# Patient Record
Sex: MALE | Race: WHITE | NOT HISPANIC OR LATINO | URBAN - METROPOLITAN AREA
[De-identification: names, ages, dates, MRNs, and addresses within clinical notes are randomized per-mention and may not be internally consistent; named-entity substitution may affect disease eponyms.]

---

## 2022-01-01 ENCOUNTER — INPATIENT (INPATIENT)
Facility: HOSPITAL | Age: 67
LOS: 1 days | End: 2022-01-28
Attending: HOSPITALIST | Admitting: HOSPITALIST
Payer: COMMERCIAL

## 2022-01-01 VITALS — HEART RATE: 95 BPM | RESPIRATION RATE: 34 BRPM

## 2022-01-01 VITALS
SYSTOLIC BLOOD PRESSURE: 141 MMHG | OXYGEN SATURATION: 85 % | HEART RATE: 107 BPM | DIASTOLIC BLOOD PRESSURE: 90 MMHG | RESPIRATION RATE: 26 BRPM | TEMPERATURE: 103 F

## 2022-01-01 DIAGNOSIS — U07.1 COVID-19: ICD-10-CM

## 2022-01-01 DIAGNOSIS — E83.51 HYPOCALCEMIA: ICD-10-CM

## 2022-01-01 DIAGNOSIS — E87.1 HYPO-OSMOLALITY AND HYPONATREMIA: ICD-10-CM

## 2022-01-01 DIAGNOSIS — J96.01 ACUTE RESPIRATORY FAILURE WITH HYPOXIA: ICD-10-CM

## 2022-01-01 LAB
A1C WITH ESTIMATED AVERAGE GLUCOSE RESULT: 6.4 % — HIGH (ref 4–5.6)
ALBUMIN SERPL ELPH-MCNC: 1.9 G/DL — LOW (ref 3.5–5.2)
ALBUMIN SERPL ELPH-MCNC: 2.4 G/DL — LOW (ref 3.5–5.2)
ALBUMIN SERPL ELPH-MCNC: 2.5 G/DL — LOW (ref 3.5–5.2)
ALBUMIN SERPL ELPH-MCNC: 2.7 G/DL — LOW (ref 3.5–5.2)
ALP SERPL-CCNC: 121 U/L — HIGH (ref 30–115)
ALP SERPL-CCNC: 44 U/L — SIGNIFICANT CHANGE UP (ref 30–115)
ALP SERPL-CCNC: 445 U/L — HIGH (ref 30–115)
ALP SERPL-CCNC: 71 U/L — SIGNIFICANT CHANGE UP (ref 30–115)
ALT FLD-CCNC: 1358 U/L — HIGH (ref 0–41)
ALT FLD-CCNC: 15 U/L — SIGNIFICANT CHANGE UP (ref 0–41)
ALT FLD-CCNC: 394 U/L — HIGH (ref 0–41)
ALT FLD-CCNC: 82 U/L — HIGH (ref 0–41)
ANION GAP SERPL CALC-SCNC: 15 MMOL/L — HIGH (ref 7–14)
ANION GAP SERPL CALC-SCNC: 16 MMOL/L — HIGH (ref 7–14)
ANION GAP SERPL CALC-SCNC: 28 MMOL/L — HIGH (ref 7–14)
ANION GAP SERPL CALC-SCNC: 28 MMOL/L — HIGH (ref 7–14)
ANION GAP SERPL CALC-SCNC: 35 MMOL/L — HIGH (ref 7–14)
ANION GAP SERPL CALC-SCNC: 39 MMOL/L — HIGH (ref 7–14)
APPEARANCE UR: CLEAR — SIGNIFICANT CHANGE UP
APTT BLD: 28.6 SEC — SIGNIFICANT CHANGE UP (ref 27–39.2)
APTT BLD: 56.7 SEC — HIGH (ref 27–39.2)
AST SERPL-CCNC: 231 U/L — HIGH (ref 0–41)
AST SERPL-CCNC: 2401 U/L — HIGH (ref 0–41)
AST SERPL-CCNC: 5 U/L — SIGNIFICANT CHANGE UP (ref 0–41)
AST SERPL-CCNC: 715 U/L — HIGH (ref 0–41)
BACTERIA # UR AUTO: ABNORMAL
BASE EXCESS BLDV CALC-SCNC: 5.5 MMOL/L — HIGH (ref -2–3)
BASOPHILS # BLD AUTO: 0.01 K/UL — SIGNIFICANT CHANGE UP (ref 0–0.2)
BASOPHILS # BLD AUTO: 0.02 K/UL — SIGNIFICANT CHANGE UP (ref 0–0.2)
BASOPHILS # BLD AUTO: 0.11 K/UL — SIGNIFICANT CHANGE UP (ref 0–0.2)
BASOPHILS # BLD AUTO: 0.19 K/UL — SIGNIFICANT CHANGE UP (ref 0–0.2)
BASOPHILS NFR BLD AUTO: 0.1 % — SIGNIFICANT CHANGE UP (ref 0–1)
BASOPHILS NFR BLD AUTO: 0.1 % — SIGNIFICANT CHANGE UP (ref 0–1)
BASOPHILS NFR BLD AUTO: 0.3 % — SIGNIFICANT CHANGE UP (ref 0–1)
BASOPHILS NFR BLD AUTO: 0.5 % — SIGNIFICANT CHANGE UP (ref 0–1)
BILIRUB SERPL-MCNC: 0.8 MG/DL — SIGNIFICANT CHANGE UP (ref 0.2–1.2)
BILIRUB SERPL-MCNC: 0.9 MG/DL — SIGNIFICANT CHANGE UP (ref 0.2–1.2)
BILIRUB SERPL-MCNC: 1.2 MG/DL — SIGNIFICANT CHANGE UP (ref 0.2–1.2)
BILIRUB SERPL-MCNC: 1.7 MG/DL — HIGH (ref 0.2–1.2)
BILIRUB UR-MCNC: NEGATIVE — SIGNIFICANT CHANGE UP
BUN SERPL-MCNC: 17 MG/DL — SIGNIFICANT CHANGE UP (ref 10–20)
BUN SERPL-MCNC: 18 MG/DL — SIGNIFICANT CHANGE UP (ref 10–20)
BUN SERPL-MCNC: 20 MG/DL — SIGNIFICANT CHANGE UP (ref 10–20)
BUN SERPL-MCNC: 20 MG/DL — SIGNIFICANT CHANGE UP (ref 10–20)
BUN SERPL-MCNC: 25 MG/DL — HIGH (ref 10–20)
BUN SERPL-MCNC: 26 MG/DL — HIGH (ref 10–20)
CA-I SERPL-SCNC: 0.85 MMOL/L — LOW (ref 1.15–1.33)
CALCIUM SERPL-MCNC: 6.3 MG/DL — LOW (ref 8.5–10.1)
CALCIUM SERPL-MCNC: 6.6 MG/DL — LOW (ref 8.5–10.1)
CALCIUM SERPL-MCNC: 6.6 MG/DL — LOW (ref 8.5–10.1)
CALCIUM SERPL-MCNC: 6.9 MG/DL — LOW (ref 8.5–10.1)
CALCIUM SERPL-MCNC: 7.2 MG/DL — LOW (ref 8.5–10.1)
CALCIUM SERPL-MCNC: 7.8 MG/DL — LOW (ref 8.5–10.1)
CHLORIDE SERPL-SCNC: 77 MMOL/L — LOW (ref 98–110)
CHLORIDE SERPL-SCNC: 78 MMOL/L — LOW (ref 98–110)
CHLORIDE SERPL-SCNC: 78 MMOL/L — LOW (ref 98–110)
CHLORIDE SERPL-SCNC: 79 MMOL/L — LOW (ref 98–110)
CHLORIDE SERPL-SCNC: 80 MMOL/L — LOW (ref 98–110)
CHLORIDE SERPL-SCNC: 81 MMOL/L — LOW (ref 98–110)
CHOLEST SERPL-MCNC: 106 MG/DL — SIGNIFICANT CHANGE UP
CK MB CFR SERPL CALC: 36.4 NG/ML — HIGH (ref 0.6–6.3)
CK SERPL-CCNC: HIGH U/L (ref 0–225)
CO2 SERPL-SCNC: 12 MMOL/L — LOW (ref 17–32)
CO2 SERPL-SCNC: 15 MMOL/L — LOW (ref 17–32)
CO2 SERPL-SCNC: 15 MMOL/L — LOW (ref 17–32)
CO2 SERPL-SCNC: 19 MMOL/L — SIGNIFICANT CHANGE UP (ref 17–32)
CO2 SERPL-SCNC: 21 MMOL/L — SIGNIFICANT CHANGE UP (ref 17–32)
CO2 SERPL-SCNC: 9 MMOL/L — CRITICAL LOW (ref 17–32)
COLOR SPEC: YELLOW — SIGNIFICANT CHANGE UP
CREAT SERPL-MCNC: 0.8 MG/DL — SIGNIFICANT CHANGE UP (ref 0.7–1.5)
CREAT SERPL-MCNC: 1 MG/DL — SIGNIFICANT CHANGE UP (ref 0.7–1.5)
CREAT SERPL-MCNC: 1.4 MG/DL — SIGNIFICANT CHANGE UP (ref 0.7–1.5)
CREAT SERPL-MCNC: 1.8 MG/DL — HIGH (ref 0.7–1.5)
CREAT SERPL-MCNC: 2.5 MG/DL — HIGH (ref 0.7–1.5)
CREAT SERPL-MCNC: 3.2 MG/DL — HIGH (ref 0.7–1.5)
CULTURE RESULTS: NO GROWTH — SIGNIFICANT CHANGE UP
D DIMER BLD IA.RAPID-MCNC: 7088 NG/ML DDU — HIGH (ref 0–230)
DIFF PNL FLD: ABNORMAL
EOSINOPHIL # BLD AUTO: 0 K/UL — SIGNIFICANT CHANGE UP (ref 0–0.7)
EOSINOPHIL # BLD AUTO: 0.01 K/UL — SIGNIFICANT CHANGE UP (ref 0–0.7)
EOSINOPHIL NFR BLD AUTO: 0 % — SIGNIFICANT CHANGE UP (ref 0–8)
EPI CELLS # UR: ABNORMAL /HPF
ESTIMATED AVERAGE GLUCOSE: 137 MG/DL — HIGH (ref 68–114)
GAS PNL BLDV: 110 MMOL/L — CRITICAL LOW (ref 136–145)
GAS PNL BLDV: SIGNIFICANT CHANGE UP
GLUCOSE SERPL-MCNC: 147 MG/DL — HIGH (ref 70–99)
GLUCOSE SERPL-MCNC: 148 MG/DL — HIGH (ref 70–99)
GLUCOSE SERPL-MCNC: 154 MG/DL — HIGH (ref 70–99)
GLUCOSE SERPL-MCNC: 163 MG/DL — HIGH (ref 70–99)
GLUCOSE SERPL-MCNC: 172 MG/DL — HIGH (ref 70–99)
GLUCOSE SERPL-MCNC: 98 MG/DL — SIGNIFICANT CHANGE UP (ref 70–99)
GLUCOSE UR QL: NEGATIVE MG/DL — SIGNIFICANT CHANGE UP
GRAN CASTS # UR COMP ASSIST: ABNORMAL /LPF
HCO3 BLDV-SCNC: 27 MMOL/L — SIGNIFICANT CHANGE UP (ref 22–29)
HCT VFR BLD CALC: 35.9 % — LOW (ref 42–52)
HCT VFR BLD CALC: 39.7 % — LOW (ref 42–52)
HCT VFR BLD CALC: 40.8 % — LOW (ref 42–52)
HCT VFR BLD CALC: 44.5 % — SIGNIFICANT CHANGE UP (ref 42–52)
HCT VFR BLDA CALC: 51 % — SIGNIFICANT CHANGE UP (ref 39–51)
HCV AB S/CO SERPL IA: 0.03 COI — SIGNIFICANT CHANGE UP
HCV AB SERPL-IMP: SIGNIFICANT CHANGE UP
HDLC SERPL-MCNC: 6 MG/DL — LOW
HGB BLD CALC-MCNC: 17.1 G/DL — SIGNIFICANT CHANGE UP (ref 12.6–17.4)
HGB BLD-MCNC: 12.6 G/DL — LOW (ref 14–18)
HGB BLD-MCNC: 12.8 G/DL — LOW (ref 14–18)
HGB BLD-MCNC: 14.5 G/DL — SIGNIFICANT CHANGE UP (ref 14–18)
HGB BLD-MCNC: 14.5 G/DL — SIGNIFICANT CHANGE UP (ref 14–18)
IMM GRANULOCYTES NFR BLD AUTO: 1.2 % — HIGH (ref 0.1–0.3)
IMM GRANULOCYTES NFR BLD AUTO: 1.6 % — HIGH (ref 0.1–0.3)
IMM GRANULOCYTES NFR BLD AUTO: 4.9 % — HIGH (ref 0.1–0.3)
IMM GRANULOCYTES NFR BLD AUTO: 5.6 % — HIGH (ref 0.1–0.3)
INR BLD: 1.37 RATIO — HIGH (ref 0.65–1.3)
KETONES UR-MCNC: 40
LACTATE BLDV-MCNC: 2.4 MMOL/L — HIGH (ref 0.5–2)
LACTATE SERPL-SCNC: 18.2 MMOL/L — CRITICAL HIGH (ref 0.7–2)
LDH SERPL L TO P-CCNC: 829 U/L — HIGH (ref 50–242)
LEUKOCYTE ESTERASE UR-ACNC: NEGATIVE — SIGNIFICANT CHANGE UP
LIPID PNL WITH DIRECT LDL SERPL: 61 MG/DL — SIGNIFICANT CHANGE UP
LYMPHOCYTES # BLD AUTO: 0.55 K/UL — LOW (ref 1.2–3.4)
LYMPHOCYTES # BLD AUTO: 0.85 K/UL — LOW (ref 1.2–3.4)
LYMPHOCYTES # BLD AUTO: 2.09 K/UL — SIGNIFICANT CHANGE UP (ref 1.2–3.4)
LYMPHOCYTES # BLD AUTO: 2.52 K/UL — SIGNIFICANT CHANGE UP (ref 1.2–3.4)
LYMPHOCYTES # BLD AUTO: 3.7 % — LOW (ref 20.5–51.1)
LYMPHOCYTES # BLD AUTO: 6.2 % — LOW (ref 20.5–51.1)
LYMPHOCYTES # BLD AUTO: 6.4 % — LOW (ref 20.5–51.1)
LYMPHOCYTES # BLD AUTO: 7.7 % — LOW (ref 20.5–51.1)
MAGNESIUM SERPL-MCNC: 2.7 MG/DL — HIGH (ref 1.8–2.4)
MCHC RBC-ENTMCNC: 28.4 PG — SIGNIFICANT CHANGE UP (ref 27–31)
MCHC RBC-ENTMCNC: 28.4 PG — SIGNIFICANT CHANGE UP (ref 27–31)
MCHC RBC-ENTMCNC: 28.7 PG — SIGNIFICANT CHANGE UP (ref 27–31)
MCHC RBC-ENTMCNC: 29 PG — SIGNIFICANT CHANGE UP (ref 27–31)
MCHC RBC-ENTMCNC: 31.7 G/DL — LOW (ref 32–37)
MCHC RBC-ENTMCNC: 32.6 G/DL — SIGNIFICANT CHANGE UP (ref 32–37)
MCHC RBC-ENTMCNC: 35.5 G/DL — SIGNIFICANT CHANGE UP (ref 32–37)
MCHC RBC-ENTMCNC: 35.7 G/DL — SIGNIFICANT CHANGE UP (ref 32–37)
MCV RBC AUTO: 79.8 FL — LOW (ref 80–94)
MCV RBC AUTO: 80.5 FL — SIGNIFICANT CHANGE UP (ref 80–94)
MCV RBC AUTO: 87.3 FL — SIGNIFICANT CHANGE UP (ref 80–94)
MCV RBC AUTO: 91.5 FL — SIGNIFICANT CHANGE UP (ref 80–94)
MONOCYTES # BLD AUTO: 0.39 K/UL — SIGNIFICANT CHANGE UP (ref 0.1–0.6)
MONOCYTES # BLD AUTO: 0.44 K/UL — SIGNIFICANT CHANGE UP (ref 0.1–0.6)
MONOCYTES # BLD AUTO: 0.44 K/UL — SIGNIFICANT CHANGE UP (ref 0.1–0.6)
MONOCYTES # BLD AUTO: 0.51 K/UL — SIGNIFICANT CHANGE UP (ref 0.1–0.6)
MONOCYTES NFR BLD AUTO: 1.1 % — LOW (ref 1.7–9.3)
MONOCYTES NFR BLD AUTO: 1.3 % — LOW (ref 1.7–9.3)
MONOCYTES NFR BLD AUTO: 2.6 % — SIGNIFICANT CHANGE UP (ref 1.7–9.3)
MONOCYTES NFR BLD AUTO: 4.6 % — SIGNIFICANT CHANGE UP (ref 1.7–9.3)
NEUTROPHILS # BLD AUTO: 13.77 K/UL — HIGH (ref 1.4–6.5)
NEUTROPHILS # BLD AUTO: 28.32 K/UL — HIGH (ref 1.4–6.5)
NEUTROPHILS # BLD AUTO: 35.44 K/UL — HIGH (ref 1.4–6.5)
NEUTROPHILS # BLD AUTO: 9.5 K/UL — HIGH (ref 1.4–6.5)
NEUTROPHILS NFR BLD AUTO: 86 % — HIGH (ref 42.2–75.2)
NEUTROPHILS NFR BLD AUTO: 86.4 % — HIGH (ref 42.2–75.2)
NEUTROPHILS NFR BLD AUTO: 87.3 % — HIGH (ref 42.2–75.2)
NEUTROPHILS NFR BLD AUTO: 92.4 % — HIGH (ref 42.2–75.2)
NITRITE UR-MCNC: NEGATIVE — SIGNIFICANT CHANGE UP
NON HDL CHOLESTEROL: 100 MG/DL — SIGNIFICANT CHANGE UP
NRBC # BLD: 0 /100 WBCS — SIGNIFICANT CHANGE UP (ref 0–0)
NRBC # BLD: 0 /100 — SIGNIFICANT CHANGE UP (ref 0–0)
OSMOLALITY SERPL: 253 MOS/KG — LOW (ref 280–301)
OSMOLALITY UR: 573 MOS/KG — SIGNIFICANT CHANGE UP (ref 50–1200)
PCO2 BLDV: 30 MMHG — LOW (ref 42–55)
PH BLDV: 7.56 — HIGH (ref 7.32–7.43)
PH UR: 6.5 — SIGNIFICANT CHANGE UP (ref 5–8)
PHOSPHATE SERPL-MCNC: 16.7 MG/DL — HIGH (ref 2.1–4.9)
PLAT MORPH BLD: NORMAL — SIGNIFICANT CHANGE UP
PLATELET # BLD AUTO: 189 K/UL — SIGNIFICANT CHANGE UP (ref 130–400)
PLATELET # BLD AUTO: 266 K/UL — SIGNIFICANT CHANGE UP (ref 130–400)
PLATELET # BLD AUTO: 276 K/UL — SIGNIFICANT CHANGE UP (ref 130–400)
PLATELET # BLD AUTO: 64 K/UL — LOW (ref 130–400)
PO2 BLDV: 29 MMHG — SIGNIFICANT CHANGE UP
POTASSIUM BLDV-SCNC: 3.1 MMOL/L — LOW (ref 3.5–5.1)
POTASSIUM SERPL-MCNC: 3.4 MMOL/L — LOW (ref 3.5–5)
POTASSIUM SERPL-MCNC: 3.8 MMOL/L — SIGNIFICANT CHANGE UP (ref 3.5–5)
POTASSIUM SERPL-MCNC: 4.3 MMOL/L — SIGNIFICANT CHANGE UP (ref 3.5–5)
POTASSIUM SERPL-MCNC: 4.9 MMOL/L — SIGNIFICANT CHANGE UP (ref 3.5–5)
POTASSIUM SERPL-MCNC: 5.4 MMOL/L — HIGH (ref 3.5–5)
POTASSIUM SERPL-MCNC: 6.5 MMOL/L — CRITICAL HIGH (ref 3.5–5)
POTASSIUM SERPL-SCNC: 3.4 MMOL/L — LOW (ref 3.5–5)
POTASSIUM SERPL-SCNC: 3.8 MMOL/L — SIGNIFICANT CHANGE UP (ref 3.5–5)
POTASSIUM SERPL-SCNC: 4.3 MMOL/L — SIGNIFICANT CHANGE UP (ref 3.5–5)
POTASSIUM SERPL-SCNC: 4.9 MMOL/L — SIGNIFICANT CHANGE UP (ref 3.5–5)
POTASSIUM SERPL-SCNC: 5.4 MMOL/L — HIGH (ref 3.5–5)
POTASSIUM SERPL-SCNC: 6.5 MMOL/L — CRITICAL HIGH (ref 3.5–5)
PROCALCITONIN SERPL-MCNC: 55.8 NG/ML — HIGH (ref 0.02–0.1)
PROT SERPL-MCNC: 4 G/DL — LOW (ref 6–8)
PROT SERPL-MCNC: 4.7 G/DL — LOW (ref 6–8)
PROT SERPL-MCNC: 4.8 G/DL — LOW (ref 6–8)
PROT SERPL-MCNC: 5.2 G/DL — LOW (ref 6–8)
PROT UR-MCNC: 100 MG/DL
PROTHROM AB SERPL-ACNC: 15.7 SEC — HIGH (ref 9.95–12.87)
RBC # BLD: 4.34 M/UL — LOW (ref 4.7–6.1)
RBC # BLD: 4.46 M/UL — LOW (ref 4.7–6.1)
RBC # BLD: 5.1 M/UL — SIGNIFICANT CHANGE UP (ref 4.7–6.1)
RBC # BLD: 5.11 M/UL — SIGNIFICANT CHANGE UP (ref 4.7–6.1)
RBC # FLD: 12.6 % — SIGNIFICANT CHANGE UP (ref 11.5–14.5)
RBC # FLD: 12.9 % — SIGNIFICANT CHANGE UP (ref 11.5–14.5)
RBC # FLD: 13.4 % — SIGNIFICANT CHANGE UP (ref 11.5–14.5)
RBC # FLD: 13.9 % — SIGNIFICANT CHANGE UP (ref 11.5–14.5)
RBC BLD AUTO: NORMAL — SIGNIFICANT CHANGE UP
RBC CASTS # UR COMP ASSIST: ABNORMAL /HPF
SAO2 % BLDV: 58.9 % — SIGNIFICANT CHANGE UP
SARS-COV-2 RNA SPEC QL NAA+PROBE: DETECTED
SODIUM SERPL-SCNC: 112 MMOL/L — CRITICAL LOW (ref 135–146)
SODIUM SERPL-SCNC: 116 MMOL/L — CRITICAL LOW (ref 135–146)
SODIUM SERPL-SCNC: 121 MMOL/L — LOW (ref 135–146)
SODIUM SERPL-SCNC: 124 MMOL/L — LOW (ref 135–146)
SODIUM SERPL-SCNC: 126 MMOL/L — LOW (ref 135–146)
SODIUM SERPL-SCNC: 126 MMOL/L — LOW (ref 135–146)
SODIUM UR-SCNC: 93 MMOL/L — SIGNIFICANT CHANGE UP
SP GR SPEC: >=1.03 (ref 1.01–1.03)
SPECIMEN SOURCE: SIGNIFICANT CHANGE UP
TRIGL SERPL-MCNC: 261 MG/DL — HIGH
TROPONIN T SERPL-MCNC: 0.3 NG/ML — CRITICAL HIGH
TROPONIN T SERPL-MCNC: 0.69 NG/ML — CRITICAL HIGH
TROPONIN T SERPL-MCNC: 1.44 NG/ML — CRITICAL HIGH
TROPONIN T SERPL-MCNC: 1.68 NG/ML — CRITICAL HIGH
TSH SERPL-MCNC: 0.87 UIU/ML — SIGNIFICANT CHANGE UP (ref 0.27–4.2)
UROBILINOGEN FLD QL: 1 MG/DL
VANCOMYCIN FLD-MCNC: 32.4 UG/ML — HIGH (ref 5–10)
WBC # BLD: 11.05 K/UL — HIGH (ref 4.8–10.8)
WBC # BLD: 14.91 K/UL — HIGH (ref 4.8–10.8)
WBC # BLD: 32.81 K/UL — HIGH (ref 4.8–10.8)
WBC # BLD: 40.61 K/UL — CRITICAL HIGH (ref 4.8–10.8)
WBC # FLD AUTO: 11.05 K/UL — HIGH (ref 4.8–10.8)
WBC # FLD AUTO: 14.91 K/UL — HIGH (ref 4.8–10.8)
WBC # FLD AUTO: 32.81 K/UL — HIGH (ref 4.8–10.8)
WBC # FLD AUTO: 40.61 K/UL — CRITICAL HIGH (ref 4.8–10.8)
WBC UR QL: NEGATIVE — SIGNIFICANT CHANGE UP

## 2022-01-01 PROCEDURE — 93306 TTE W/DOPPLER COMPLETE: CPT | Mod: 26

## 2022-01-01 PROCEDURE — 99291 CRITICAL CARE FIRST HOUR: CPT

## 2022-01-01 PROCEDURE — 99222 1ST HOSP IP/OBS MODERATE 55: CPT

## 2022-01-01 PROCEDURE — 93010 ELECTROCARDIOGRAM REPORT: CPT

## 2022-01-01 PROCEDURE — 93970 EXTREMITY STUDY: CPT | Mod: 26

## 2022-01-01 PROCEDURE — 71045 X-RAY EXAM CHEST 1 VIEW: CPT | Mod: 26

## 2022-01-01 PROCEDURE — 99232 SBSQ HOSP IP/OBS MODERATE 35: CPT

## 2022-01-01 RX ORDER — ASCORBIC ACID 60 MG
500 TABLET,CHEWABLE ORAL DAILY
Refills: 0 | Status: DISCONTINUED | OUTPATIENT
Start: 2022-01-01 | End: 2022-01-01

## 2022-01-01 RX ORDER — VANCOMYCIN HCL 1 G
1000 VIAL (EA) INTRAVENOUS ONCE
Refills: 0 | Status: COMPLETED | OUTPATIENT
Start: 2022-01-01 | End: 2022-01-01

## 2022-01-01 RX ORDER — AMIODARONE HYDROCHLORIDE 400 MG/1
0.5 TABLET ORAL
Qty: 900 | Refills: 0 | Status: DISCONTINUED | OUTPATIENT
Start: 2022-01-01 | End: 2022-01-01

## 2022-01-01 RX ORDER — INSULIN HUMAN 100 [IU]/ML
10 INJECTION, SOLUTION SUBCUTANEOUS ONCE
Refills: 0 | Status: COMPLETED | OUTPATIENT
Start: 2022-01-01 | End: 2022-01-01

## 2022-01-01 RX ORDER — CHLORHEXIDINE GLUCONATE 213 G/1000ML
15 SOLUTION TOPICAL EVERY 12 HOURS
Refills: 0 | Status: DISCONTINUED | OUTPATIENT
Start: 2022-01-01 | End: 2022-01-01

## 2022-01-01 RX ORDER — FUROSEMIDE 40 MG
80 TABLET ORAL ONCE
Refills: 0 | Status: COMPLETED | OUTPATIENT
Start: 2022-01-01 | End: 2022-01-01

## 2022-01-01 RX ORDER — HYDROMORPHONE HYDROCHLORIDE 2 MG/ML
1 INJECTION INTRAMUSCULAR; INTRAVENOUS; SUBCUTANEOUS
Refills: 0 | Status: DISCONTINUED | OUTPATIENT
Start: 2022-01-01 | End: 2022-01-01

## 2022-01-01 RX ORDER — DEXMEDETOMIDINE HYDROCHLORIDE IN 0.9% SODIUM CHLORIDE 4 UG/ML
0.05 INJECTION INTRAVENOUS
Qty: 400 | Refills: 0 | Status: DISCONTINUED | OUTPATIENT
Start: 2022-01-01 | End: 2022-01-01

## 2022-01-01 RX ORDER — METOPROLOL TARTRATE 50 MG
5 TABLET ORAL ONCE
Refills: 0 | Status: COMPLETED | OUTPATIENT
Start: 2022-01-01 | End: 2022-01-01

## 2022-01-01 RX ORDER — CALCIUM GLUCONATE 100 MG/ML
1 VIAL (ML) INTRAVENOUS ONCE
Refills: 0 | Status: COMPLETED | OUTPATIENT
Start: 2022-01-01 | End: 2022-01-01

## 2022-01-01 RX ORDER — DEXTROSE 50 % IN WATER 50 %
50 SYRINGE (ML) INTRAVENOUS ONCE
Refills: 0 | Status: COMPLETED | OUTPATIENT
Start: 2022-01-01 | End: 2022-01-01

## 2022-01-01 RX ORDER — SODIUM CHLORIDE 9 MG/ML
1000 INJECTION INTRAMUSCULAR; INTRAVENOUS; SUBCUTANEOUS ONCE
Refills: 0 | Status: COMPLETED | OUTPATIENT
Start: 2022-01-01 | End: 2022-01-01

## 2022-01-01 RX ORDER — SODIUM CHLORIDE 5 G/100ML
90 INJECTION, SOLUTION INTRAVENOUS
Refills: 0 | Status: DISCONTINUED | OUTPATIENT
Start: 2022-01-01 | End: 2022-01-01

## 2022-01-01 RX ORDER — CEFEPIME 1 G/1
2000 INJECTION, POWDER, FOR SOLUTION INTRAMUSCULAR; INTRAVENOUS ONCE
Refills: 0 | Status: COMPLETED | OUTPATIENT
Start: 2022-01-01 | End: 2022-01-01

## 2022-01-01 RX ORDER — CEFEPIME 1 G/1
1000 INJECTION, POWDER, FOR SOLUTION INTRAMUSCULAR; INTRAVENOUS EVERY 8 HOURS
Refills: 0 | Status: DISCONTINUED | OUTPATIENT
Start: 2022-01-01 | End: 2022-01-01

## 2022-01-01 RX ORDER — FENTANYL CITRATE 50 UG/ML
0.5 INJECTION INTRAVENOUS
Qty: 2500 | Refills: 0 | Status: DISCONTINUED | OUTPATIENT
Start: 2022-01-01 | End: 2022-01-01

## 2022-01-01 RX ORDER — SODIUM ZIRCONIUM CYCLOSILICATE 10 G/10G
10 POWDER, FOR SUSPENSION ORAL ONCE
Refills: 0 | Status: COMPLETED | OUTPATIENT
Start: 2022-01-01 | End: 2022-01-01

## 2022-01-01 RX ORDER — VANCOMYCIN HCL 1 G
1500 VIAL (EA) INTRAVENOUS EVERY 12 HOURS
Refills: 0 | Status: DISCONTINUED | OUTPATIENT
Start: 2022-01-01 | End: 2022-01-01

## 2022-01-01 RX ORDER — HEPARIN SODIUM 5000 [USP'U]/ML
5000 INJECTION INTRAVENOUS; SUBCUTANEOUS EVERY 8 HOURS
Refills: 0 | Status: DISCONTINUED | OUTPATIENT
Start: 2022-01-01 | End: 2022-01-01

## 2022-01-01 RX ORDER — CISATRACURIUM BESYLATE 2 MG/ML
15 INJECTION INTRAVENOUS ONCE
Refills: 0 | Status: COMPLETED | OUTPATIENT
Start: 2022-01-01 | End: 2022-01-01

## 2022-01-01 RX ORDER — MIDAZOLAM HYDROCHLORIDE 1 MG/ML
0.02 INJECTION, SOLUTION INTRAMUSCULAR; INTRAVENOUS
Qty: 100 | Refills: 0 | Status: DISCONTINUED | OUTPATIENT
Start: 2022-01-01 | End: 2022-01-01

## 2022-01-01 RX ORDER — CEFEPIME 1 G/1
1000 INJECTION, POWDER, FOR SOLUTION INTRAMUSCULAR; INTRAVENOUS DAILY
Refills: 0 | Status: DISCONTINUED | OUTPATIENT
Start: 2022-01-01 | End: 2022-01-01

## 2022-01-01 RX ORDER — CISATRACURIUM BESYLATE 2 MG/ML
3 INJECTION INTRAVENOUS
Qty: 200 | Refills: 0 | Status: DISCONTINUED | OUTPATIENT
Start: 2022-01-01 | End: 2022-01-01

## 2022-01-01 RX ORDER — ACETAMINOPHEN 500 MG
650 TABLET ORAL EVERY 4 HOURS
Refills: 0 | Status: DISCONTINUED | OUTPATIENT
Start: 2022-01-01 | End: 2022-01-01

## 2022-01-01 RX ORDER — ACETAMINOPHEN 500 MG
975 TABLET ORAL ONCE
Refills: 0 | Status: COMPLETED | OUTPATIENT
Start: 2022-01-01 | End: 2022-01-01

## 2022-01-01 RX ORDER — HEPARIN SODIUM 5000 [USP'U]/ML
1800 INJECTION INTRAVENOUS; SUBCUTANEOUS
Qty: 25000 | Refills: 0 | Status: DISCONTINUED | OUTPATIENT
Start: 2022-01-01 | End: 2022-01-01

## 2022-01-01 RX ORDER — AMIODARONE HYDROCHLORIDE 400 MG/1
150 TABLET ORAL ONCE
Refills: 0 | Status: COMPLETED | OUTPATIENT
Start: 2022-01-01 | End: 2022-01-01

## 2022-01-01 RX ORDER — VASOPRESSIN 20 [USP'U]/ML
0.04 INJECTION INTRAVENOUS
Qty: 50 | Refills: 0 | Status: DISCONTINUED | OUTPATIENT
Start: 2022-01-01 | End: 2022-01-01

## 2022-01-01 RX ORDER — HYDROMORPHONE HYDROCHLORIDE 2 MG/ML
1 INJECTION INTRAMUSCULAR; INTRAVENOUS; SUBCUTANEOUS EVERY 4 HOURS
Refills: 0 | Status: DISCONTINUED | OUTPATIENT
Start: 2022-01-01 | End: 2022-01-01

## 2022-01-01 RX ORDER — POTASSIUM CHLORIDE 20 MEQ
20 PACKET (EA) ORAL ONCE
Refills: 0 | Status: COMPLETED | OUTPATIENT
Start: 2022-01-01 | End: 2022-01-01

## 2022-01-01 RX ORDER — CISATRACURIUM BESYLATE 2 MG/ML
20 INJECTION INTRAVENOUS ONCE
Refills: 0 | Status: COMPLETED | OUTPATIENT
Start: 2022-01-01 | End: 2022-01-01

## 2022-01-01 RX ORDER — ENOXAPARIN SODIUM 100 MG/ML
40 INJECTION SUBCUTANEOUS DAILY
Refills: 0 | Status: DISCONTINUED | OUTPATIENT
Start: 2022-01-01 | End: 2022-01-01

## 2022-01-01 RX ORDER — FENTANYL CITRATE 50 UG/ML
50 INJECTION INTRAVENOUS ONCE
Refills: 0 | Status: DISCONTINUED | OUTPATIENT
Start: 2022-01-01 | End: 2022-01-01

## 2022-01-01 RX ORDER — PROPOFOL 10 MG/ML
5 INJECTION, EMULSION INTRAVENOUS
Qty: 1000 | Refills: 0 | Status: DISCONTINUED | OUTPATIENT
Start: 2022-01-01 | End: 2022-01-01

## 2022-01-01 RX ORDER — NOREPINEPHRINE BITARTRATE/D5W 8 MG/250ML
0.05 PLASTIC BAG, INJECTION (ML) INTRAVENOUS
Qty: 32 | Refills: 0 | Status: DISCONTINUED | OUTPATIENT
Start: 2022-01-01 | End: 2022-01-01

## 2022-01-01 RX ORDER — AMIODARONE HYDROCHLORIDE 400 MG/1
1 TABLET ORAL
Qty: 900 | Refills: 0 | Status: DISCONTINUED | OUTPATIENT
Start: 2022-01-01 | End: 2022-01-01

## 2022-01-01 RX ORDER — DEXAMETHASONE 0.5 MG/5ML
6 ELIXIR ORAL DAILY
Refills: 0 | Status: DISCONTINUED | OUTPATIENT
Start: 2022-01-01 | End: 2022-01-01

## 2022-01-01 RX ORDER — SODIUM BICARBONATE 1 MEQ/ML
0.22 SYRINGE (ML) INTRAVENOUS
Qty: 150 | Refills: 0 | Status: DISCONTINUED | OUTPATIENT
Start: 2022-01-01 | End: 2022-01-01

## 2022-01-01 RX ORDER — VANCOMYCIN HCL 1 G
1500 VIAL (EA) INTRAVENOUS ONCE
Refills: 0 | Status: COMPLETED | OUTPATIENT
Start: 2022-01-01 | End: 2022-01-01

## 2022-01-01 RX ORDER — TOCILIZUMAB 20 MG/ML
600 INJECTION, SOLUTION, CONCENTRATE INTRAVENOUS ONCE
Refills: 0 | Status: COMPLETED | OUTPATIENT
Start: 2022-01-01 | End: 2022-01-01

## 2022-01-01 RX ADMIN — Medication 975 MILLIGRAM(S): at 00:25

## 2022-01-01 RX ADMIN — TOCILIZUMAB 100 MILLIGRAM(S): 20 INJECTION, SOLUTION, CONCENTRATE INTRAVENOUS at 19:02

## 2022-01-01 RX ADMIN — Medication 150 MEQ/KG/HR: at 12:52

## 2022-01-01 RX ADMIN — AMIODARONE HYDROCHLORIDE 16.7 MG/MIN: 400 TABLET ORAL at 21:01

## 2022-01-01 RX ADMIN — MIDAZOLAM HYDROCHLORIDE 2.02 MG/KG/HR: 1 INJECTION, SOLUTION INTRAMUSCULAR; INTRAVENOUS at 12:36

## 2022-01-01 RX ADMIN — AMIODARONE HYDROCHLORIDE 16.7 MG/MIN: 400 TABLET ORAL at 18:15

## 2022-01-01 RX ADMIN — FENTANYL CITRATE 50 MICROGRAM(S): 50 INJECTION INTRAVENOUS at 10:15

## 2022-01-01 RX ADMIN — Medication 50 MILLILITER(S): at 16:05

## 2022-01-01 RX ADMIN — SODIUM ZIRCONIUM CYCLOSILICATE 10 GRAM(S): 10 POWDER, FOR SUSPENSION ORAL at 15:16

## 2022-01-01 RX ADMIN — Medication 4.73 MICROGRAM(S)/KG/MIN: at 11:30

## 2022-01-01 RX ADMIN — VASOPRESSIN 2.4 UNIT(S)/MIN: 20 INJECTION INTRAVENOUS at 11:43

## 2022-01-01 RX ADMIN — AMIODARONE HYDROCHLORIDE 33.3 MG/MIN: 400 TABLET ORAL at 11:06

## 2022-01-01 RX ADMIN — Medication 300 MILLIGRAM(S): at 13:39

## 2022-01-01 RX ADMIN — FENTANYL CITRATE 50 MICROGRAM(S): 50 INJECTION INTRAVENOUS at 10:00

## 2022-01-01 RX ADMIN — CEFEPIME 100 MILLIGRAM(S): 1 INJECTION, POWDER, FOR SOLUTION INTRAMUSCULAR; INTRAVENOUS at 18:13

## 2022-01-01 RX ADMIN — Medication 150 MEQ/KG/HR: at 12:19

## 2022-01-01 RX ADMIN — Medication 4.73 MICROGRAM(S)/KG/MIN: at 13:04

## 2022-01-01 RX ADMIN — AMIODARONE HYDROCHLORIDE 600 MILLIGRAM(S): 400 TABLET ORAL at 11:03

## 2022-01-01 RX ADMIN — HEPARIN SODIUM 18 UNIT(S)/HR: 5000 INJECTION INTRAVENOUS; SUBCUTANEOUS at 13:39

## 2022-01-01 RX ADMIN — CISATRACURIUM BESYLATE 18.2 MICROGRAM(S)/KG/MIN: 2 INJECTION INTRAVENOUS at 12:00

## 2022-01-01 RX ADMIN — PROPOFOL 3.03 MICROGRAM(S)/KG/MIN: 10 INJECTION, EMULSION INTRAVENOUS at 10:33

## 2022-01-01 RX ADMIN — FENTANYL CITRATE 5.05 MICROGRAM(S)/KG/HR: 50 INJECTION INTRAVENOUS at 00:49

## 2022-01-01 RX ADMIN — Medication 300 MILLIGRAM(S): at 00:52

## 2022-01-01 RX ADMIN — FENTANYL CITRATE 5.05 MICROGRAM(S)/KG/HR: 50 INJECTION INTRAVENOUS at 12:36

## 2022-01-01 RX ADMIN — CEFEPIME 100 MILLIGRAM(S): 1 INJECTION, POWDER, FOR SOLUTION INTRAMUSCULAR; INTRAVENOUS at 12:18

## 2022-01-01 RX ADMIN — CEFEPIME 100 MILLIGRAM(S): 1 INJECTION, POWDER, FOR SOLUTION INTRAMUSCULAR; INTRAVENOUS at 00:08

## 2022-01-01 RX ADMIN — Medication 6 MILLIGRAM(S): at 09:28

## 2022-01-01 RX ADMIN — CHLORHEXIDINE GLUCONATE 15 MILLILITER(S): 213 SOLUTION TOPICAL at 18:14

## 2022-01-01 RX ADMIN — Medication 975 MILLIGRAM(S): at 04:30

## 2022-01-01 RX ADMIN — Medication 4.73 MICROGRAM(S)/KG/MIN: at 23:05

## 2022-01-01 RX ADMIN — CISATRACURIUM BESYLATE 20 MILLIGRAM(S): 2 INJECTION INTRAVENOUS at 11:00

## 2022-01-01 RX ADMIN — Medication 650 MILLIGRAM(S): at 09:23

## 2022-01-01 RX ADMIN — Medication 150 MEQ/KG/HR: at 21:00

## 2022-01-01 RX ADMIN — VASOPRESSIN 2.4 UNIT(S)/MIN: 20 INJECTION INTRAVENOUS at 21:01

## 2022-01-01 RX ADMIN — Medication 5 MILLIGRAM(S): at 09:56

## 2022-01-01 RX ADMIN — Medication 4.73 MICROGRAM(S)/KG/MIN: at 00:49

## 2022-01-01 RX ADMIN — Medication 500 MILLIGRAM(S): at 12:18

## 2022-01-01 RX ADMIN — Medication 100 GRAM(S): at 15:16

## 2022-01-01 RX ADMIN — SODIUM CHLORIDE 1000 MILLILITER(S): 9 INJECTION INTRAMUSCULAR; INTRAVENOUS; SUBCUTANEOUS at 08:34

## 2022-01-01 RX ADMIN — CEFEPIME 100 MILLIGRAM(S): 1 INJECTION, POWDER, FOR SOLUTION INTRAMUSCULAR; INTRAVENOUS at 23:05

## 2022-01-01 RX ADMIN — Medication 150 MEQ/KG/HR: at 16:13

## 2022-01-01 RX ADMIN — FENTANYL CITRATE 5.05 MICROGRAM(S)/KG/HR: 50 INJECTION INTRAVENOUS at 21:00

## 2022-01-01 RX ADMIN — SODIUM CHLORIDE 30 MILLILITER(S): 5 INJECTION, SOLUTION INTRAVENOUS at 05:09

## 2022-01-01 RX ADMIN — CEFEPIME 100 MILLIGRAM(S): 1 INJECTION, POWDER, FOR SOLUTION INTRAMUSCULAR; INTRAVENOUS at 09:22

## 2022-01-01 RX ADMIN — Medication 6 MILLIGRAM(S): at 05:27

## 2022-01-01 RX ADMIN — Medication 100 GRAM(S): at 05:46

## 2022-01-01 RX ADMIN — CHLORHEXIDINE GLUCONATE 15 MILLILITER(S): 213 SOLUTION TOPICAL at 16:27

## 2022-01-01 RX ADMIN — CISATRACURIUM BESYLATE 15 MILLIGRAM(S): 2 INJECTION INTRAVENOUS at 12:28

## 2022-01-01 RX ADMIN — DEXMEDETOMIDINE HYDROCHLORIDE IN 0.9% SODIUM CHLORIDE 1.26 MICROGRAM(S)/KG/HR: 4 INJECTION INTRAVENOUS at 09:45

## 2022-01-01 RX ADMIN — CHLORHEXIDINE GLUCONATE 15 MILLILITER(S): 213 SOLUTION TOPICAL at 05:27

## 2022-01-01 RX ADMIN — Medication 80 MILLIGRAM(S): at 18:14

## 2022-01-01 RX ADMIN — INSULIN HUMAN 10 UNIT(S): 100 INJECTION, SOLUTION SUBCUTANEOUS at 16:02

## 2022-01-01 RX ADMIN — Medication 4.73 MICROGRAM(S)/KG/MIN: at 21:00

## 2022-01-01 RX ADMIN — Medication 250 MILLIGRAM(S): at 02:40

## 2022-01-27 NOTE — H&P ADULT - NSHPLABSRESULTS_GEN_ALL_CORE
12.8   11.05 )-----------( 276      ( 27 Jan 2022 00:00 )             35.9       01-27    112<LL>  |  77<L>  |  18  ----------------------------<  147<H>  3.8   |  19  |  1.0    Ca    6.6<L>      27 Jan 2022 00:00    TPro  5.2<L>  /  Alb  2.7<L>  /  TBili  0.8  /  DBili  x   /  AST  231<H>  /  ALT  82<H>  /  AlkPhos  44  01-27                  PT/INR - ( 27 Jan 2022 00:00 )   PT: 15.70 sec;   INR: 1.37 ratio         PTT - ( 27 Jan 2022 00:00 )  PTT:28.6 sec    Lactate Trend            CAPILLARY BLOOD GLUCOSE

## 2022-01-27 NOTE — PROGRESS NOTE ADULT - SUBJECTIVE AND OBJECTIVE BOX
Patient is a 66y old  Male who presents with a chief complaint of Confusion / Covid 19 / Hyponatremia (27 Jan 2022 09:50)  during intubation in unit pt had v-tach and became pulseless ACLS protocol x 7 min with shock x 1 then ROSC       T(F): 98.2 (01-27-22 @ 07:02), Max: 102.8 (01-26-22 @ 23:09)  HR: 113 (01-27-22 @ 07:30)  BP: 161/90 (01-27-22 @ 07:30)  RR: 22  SpO2: 99% (01-27-22 @ 09:28) (85% - 99%)    PHYSICAL EXAM:  GENERAL: NAD  HEAD:  Atraumatic, Normocephalic  EYES: EOMI, PERRLA, conjunctiva and sclera clear  NERVOUS SYSTEM:   no focal deficits   CHEST/LUNG:  bilateral rhonchi  HEART: irregular, tachy  ABDOMEN: Soft, Nontender, Nondistended; Bowel sounds present  EXTREMITIES:  2+ Peripheral Pulses, No clubbing, cyanosis, or edema    LABS  01-27    116<LL>  |  80<L>  |  17  ----------------------------<  172<H>  3.4<L>   |  21  |  0.8    Ca    7.2<L>      27 Jan 2022 07:30    TPro  5.2<L>  /  Alb  2.7<L>  /  TBili  0.8  /  DBili  x   /  AST  231<H>  /  ALT  82<H>  /  AlkPhos  44  01-27                          14.5   14.91 )-----------( 266      ( 27 Jan 2022 07:30 )             40.8     PT/INR - ( 27 Jan 2022 00:00 )   PT: 15.70 sec;   INR: 1.37 ratio         PTT - ( 27 Jan 2022 00:00 )  PTT:28.6 sec    EKG - rapid afib      RADIOLOGY  < from: Xray Chest 1 View- PORTABLE-Urgent (01.27.22 @ 00:55) >  Impression:    Extensive patchy bilateral pulmonary opacities. Follow-up recommended.      < end of copied text >  COVID-19 PCR (01.27.22 @ 00:00)   COVID-19 PCR: Detected  MEDICATIONS  (STANDING):  aMIOdarone Infusion 1 mG/Min (33.3 mL/Hr) IV Continuous <Continuous>  aMIOdarone Infusion 0.5 mG/Min (16.7 mL/Hr) IV Continuous <Continuous>  aMIOdarone IVPB 150 milliGRAM(s) IV Intermittent once  cefepime   IVPB 1000 milliGRAM(s) IV Intermittent every 8 hours  chlorhexidine 0.12% Liquid 15 milliLiter(s) Oral Mucosa every 12 hours  cisatracurium Infusion 3 MICROgram(s)/kG/Min (18.2 mL/Hr) IV Continuous <Continuous>  cisatracurium Injectable 20 milliGRAM(s) IV Push once  dexAMETHasone  Injectable 6 milliGRAM(s) IV Push daily  dexMEDEtomidine Infusion 0.05 MICROgram(s)/kG/Hr (1.26 mL/Hr) IV Continuous <Continuous>  enoxaparin Injectable 40 milliGRAM(s) SubCutaneous daily  fentaNYL    Injectable 50 MICROGram(s) IV Push once  HYDROmorphone  Injectable 1 milliGRAM(s) IV Push every 4 hours  potassium chloride  20 mEq/100 mL IVPB 20 milliEquivalent(s) IV Intermittent once  propofol Infusion 5 MICROgram(s)/kG/Min (3.03 mL/Hr) IV Continuous <Continuous>  vancomycin  IVPB 1500 milliGRAM(s) IV Intermittent every 12 hours    MEDICATIONS  (PRN):  acetaminophen     Tablet .. 650 milliGRAM(s) Oral every 4 hours PRN Temp greater or equal to 38.5C (101.3F)

## 2022-01-27 NOTE — CONSULT NOTE ADULT - ATTENDING COMMENTS
Patient now on vent FIO2 100%. On mild sedation not responsive. Check cardiac enzyme . Correct lytes Check cardiac enzymes. Rx covid. Echo. Support . Complete iv amiodarone.  Prognosis poor Patient now on vent FIO2 100%. On mild sedation not responsive. Check cardiac enzyme . Correct lytes Check cardiac enzymes. Rx covid. Support . Complete iv amiodarone. Echo lv severely impaired MB not high with very high cpk. Possible stunned myocardium.  Prognosis very poor

## 2022-01-27 NOTE — ED PROVIDER NOTE - CLINICAL SUMMARY MEDICAL DECISION MAKING FREE TEXT BOX
covid w hypoxia  labs, imaging supportive care  admit to hosp covid w hypoxia  labs, imaging supportive care, bipap, hyponatremia  admit to hosp/icu

## 2022-01-27 NOTE — H&P ADULT - ATTENDING COMMENTS
Admit to inpatient CCU Crit   Start IV decadron  ID Consult  Remdesinivir  D-Dimer, Procalcitonin,   VTE prophylaxis  IV Abx  Hypertonic saline 3% - 30cc q3 hr ( Total 90cc)  Repeat bmp q6 hour  Fluid restriction  Nephrology consult  dvt/gi prop

## 2022-01-27 NOTE — H&P ADULT - PROBLEM SELECTOR PLAN 2
Hypertonic saline 3% - 30cc q3 hr ( Total 90cc)  Repeat bmp q6 hour Hypertonic saline 3% - 30cc q3 hr ( Total 90cc)  Repeat bmp q6 hour  Fluid restriction  Nephrology consult

## 2022-01-27 NOTE — ED PROVIDER NOTE - PHYSICAL EXAMINATION
CONSTITUTIONAL: in moderate resp distress, +febrile  SKIN: Warm, dry  EYES: No conjunctival injection. PERRLA. EOMI  ENT: No nasal discharge; oropharynx nonerythematous; airway clear  NECK: Supple; non tender, No JVD  CARD:  tachycardic  RESP: crackles bilaterally, tachypneic  ABD: Soft NTND; No guarding or rebound tenderness  EXT: Normal ROM.  No clubbing or cyanosis.  No edema  NEURO: A&O x0, confused but following commands, grossly unremarkable, no focal deficits  *Chaperone was used during the encounter

## 2022-01-27 NOTE — CONSULT NOTE ADULT - SUBJECTIVE AND OBJECTIVE BOX
HPI:  65 y/o Male with PMHx of BPH, and Covid+ 1/19/22 who presents with worsening SOB and confusion.  As per wife, pt more SOB today, 85% on home pulse ox.  More altered today than yesterday.  Went to PMD on weekend, received CXR, called yesterday for result of PNA on cxr, given antibx. Pt wife states prior to yesterday patient had no confusion.  No hx of cancer, DVT.  Per family, no other med hx.  (27 Jan 2022 03:09)      HPI-Cardiology   Pt evaluated at bedside. Currently admitted to CCU for covid PNA. Radiology tests and hospital records, were reviewed, as well as previous notes on this patient. Pt is intubated, history taken from ED records. Pt was brought by family came for worsening confusion and sob, covid positive since 1/19/2022. Afib with RVR since yesterday, went in respiratory distress on high flow NC (was transitioned from bipap by ICU team). He than went to [facility], became pulseless and was intubated. Pt was coded 3times in total. Placed on Amiodarone drip, Precedex, propofol, Vasopressin drips.      PAST MEDICAL & SURGICAL HISTORY  History of BPH  covid (Jan 2022)    FAMILY HISTORY:  unknown      SOCIAL HISTORY:  []smoker - unknown  []Alcohol - unknown  []Drug - unknown    ALLERGIES:  Allergy Status Unknown      MEDICATIONS:  MEDICATIONS  (STANDING):  aMIOdarone Infusion 1 mG/Min (33.3 mL/Hr) IV Continuous <Continuous>  aMIOdarone Infusion 0.5 mG/Min (16.7 mL/Hr) IV Continuous <Continuous>  cefepime   IVPB 1000 milliGRAM(s) IV Intermittent every 8 hours  chlorhexidine 0.12% Liquid 15 milliLiter(s) Oral Mucosa every 12 hours  cisatracurium Infusion 3 MICROgram(s)/kG/Min (18.2 mL/Hr) IV Continuous <Continuous>  dexAMETHasone  Injectable 6 milliGRAM(s) IV Push daily  dexMEDEtomidine Infusion 0.05 MICROgram(s)/kG/Hr (1.26 mL/Hr) IV Continuous <Continuous>  enoxaparin Injectable 40 milliGRAM(s) SubCutaneous daily  HYDROmorphone  Injectable 1 milliGRAM(s) IV Push every 4 hours  potassium chloride  20 mEq/100 mL IVPB 20 milliEquivalent(s) IV Intermittent once  propofol Infusion 5 MICROgram(s)/kG/Min (3.03 mL/Hr) IV Continuous <Continuous>  vancomycin  IVPB 1500 milliGRAM(s) IV Intermittent every 12 hours  MEDICATIONS  (PRN):  acetaminophen     Tablet .. 650 milliGRAM(s) Oral every 4 hours PRN Temp greater or equal to 38.5C (101.3F)      HOME MEDICATIONS:  unknown      VITALS:   T(F): 98.2 (01-27 @ 07:02), Max: 102.8 (01-26 @ 23:09)  HR: 113 (01-27 @ 07:30) (103 - 113)  BP: 161/90 (01-27 @ 07:30) (141/90 - 163/97)  BP(mean): 117 (01-27 @ 07:30) (117 - 124)  RR: 34 (01-27 @ 09:28) (20 - 39)  SpO2: 99% (01-27 @ 09:28) (85% - 99%)  I&O's Summary  26 Jan 2022 07:01  -  27 Jan 2022 07:00  --------------------------------------------------------  IN: 60 mL / OUT: 0 mL / NET: 60 mL  27 Jan 2022 07:01  -  27 Jan 2022 11:33  --------------------------------------------------------  IN: 0 mL / OUT: 200 mL / NET: -200 mL        REVIEW OF SYSTEMS:  intubate, sedated    PHYSICAL EXAM:  NEURO: intubated, sedated  GEN: acute respiratory distress, cardiac arrest  NECK: no thyroid enlargement, no JVD  LUNGS: b/l rhonchi, intubated  CARDIOVASCULAR: sinus tachycardi, S1/S2 present, RRR , no murmurs or rubs, no carotid bruits,  + PP bilaterally  ABD: obese, Soft, non-tender, non-distended, +BS  EXT: trace edema b/l  SKIN: Intact    LABS:                        14.5   14.91 )-----------( 266      ( 27 Jan 2022 07:30 )             40.8   01-27  116<LL>  |  80<L>  |  17  ----------------------------<  172<H>  3.4<L>   |  21  |  0.8  Ca    7.2<L>      27 Jan 2022 07:30  TPro  5.2<L>  /  Alb  2.7<L>  /  TBili  0.8  /  DBili  x   /  AST  231<H>  /  ALT  82<H>  /  AlkPhos  44  01-27  PT/INR - ( 27 Jan 2022 00:00 )   PT: 15.70 sec;   INR: 1.37 ratio    PTT - ( 27 Jan 2022 00:00 )  PTT:28.6 sec        RADIOLOGY:  cxr< from: Xray Chest 1 View- PORTABLE-Urgent (01.27.22 @ 00:55) >  Impression:  Extensive patchy bilateral pulmonary opacities. Follow-up recommended.  --- End of Report ---  < end of copied text >    ecg< from: 12 Lead ECG (01.26.22 @ 23:07) >  Diagnosis Line Atrial fibrillation with rapid ventricular response  Ventricular Rate 110 BPM  < end of copied text >    ECG:    TELEMETRY EVENTS: sinus tachyacrdia on monitor

## 2022-01-27 NOTE — CONSULT NOTE ADULT - ATTENDING COMMENTS
patient seen and examined agree above note   patient seen after intubation during CPR   he presented for ARF was on NIV restless, hypoxic tachycardic tachypnea required intubation   s/p 3 code blue   repeat ABG in1 hr increase rate to 30 , PEEP 14 ,  bicarb drip start   monitor is and os   stat BMP, cbc , CE   d-dimer   doppler lower   procal continue abx   follow nasal mrsa  Anticoagulation   call ID for toci   dexa   taper pressors to keep map 65   tuttle cath monitor is and os   case discussed with wife and her grandson and son in law twice

## 2022-01-27 NOTE — H&P ADULT - HISTORY OF PRESENT ILLNESS
65 y/o Male with PMHx of BPH, and Covid+ 1/19/22 who presents with worsening SOB and confusion.  As per wife, pt more SOB today, 85% on home pulse ox.  More altered today than yesterday.  Went to PMD on weekend, received CXR, called yesterday for result of PNA on cxr, given antibx. Pt wife states prior to yesterday patient had no confusion.  No hx of cancer, DVT.  Per family, no other med hx.

## 2022-01-27 NOTE — ED PROVIDER NOTE - OBJECTIVE STATEMENT
67 yo M with PMH BPH, Covid+ 1/19/22 who presents with worsening SOB and MS.  Per family, pt more SOB today, 85% on home pulse ox.  More altered today than yesterday.  Went to PMD on weekend, received CXR, called yesterday for result of PNA on cxr, given antibx. No hx of cancer, DVT.  Per family, no other med hx.

## 2022-01-27 NOTE — PROGRESS NOTE ADULT - ASSESSMENT
65 y/o Male with PMHx of BPH, and Covid+ 1/19/22 who presents with worsening SOB and confusion.  As per wife, pt more SOB , 85% on home pulse ox.      acute hypoxic respiratory failure secondary to COVID-19 / new onset possibly paroxysmal afib - with rapid rate / cardiac arrest s/p 7 min code blue with shock for v-tach / hyponatremia s/p 3% saline     - continue dexamethasone 6mg daily   - consider toci x 1 - consult ID   - continue IV amiodarone load and drip   - start unfractionated heparin drip after central line placement and check ptt after 6 hrs and adjust   - check cardiac enzymes and 2DECO   - check procal, crp, d-dimer and ferritin    - dc 3%, may continue with NS - consult nephrology   - supplement hypokalemia and maintain above 4   - consult cardiology and pulmonay

## 2022-01-27 NOTE — H&P ADULT - NSHPPHYSICALEXAM_GEN_ALL_CORE
GENERAL:  67y/o Male NAD, resting comfortably; on BiPaP  HEAD:  Atraumatic, Normocephalic  EYES: EOMI, PERRLA, conjunctiva and sclera clear  NECK: Supple, No JVD, no cervical lymphadenopathy, non-tender  CHEST/LUNG: Clear to auscultation bilaterally; No wheeze, rhonchi, or rales  HEART: Regular rate and rhythm; S1&S2  ABDOMEN: Soft, Nontender, Nondistended x 4 quadrants; Bowel sounds present  EXTREMITIES:   Peripheral Pulses Present, No clubbing, no cyanosis, or no edema, no calf tenderness  PSYCH: AAOx3, cooperative, appropriate  NEUROLOGY: A&O x 1 - patient confusion waxes and wanes since today  SKIN: WNL

## 2022-01-27 NOTE — ED PROVIDER NOTE - PROGRESS NOTE DETAILS
AH - called ICU PA for admission, will assess pt AH - On arrival 85% on RA, tachypeniec.  Placed on BiPAP, improved  - fluids held 2/2 hyponatremia; spoke to intensivist Marisabel approved; signed out to Hospitalist Gabby AH - fluids held 2/2 hyponatremia; spoke to intensivist Marisabel, approved; signed out to Hospitalist Gabby  will manage hypertonic saline as inpt

## 2022-01-27 NOTE — CONSULT NOTE ADULT - ASSESSMENT
Patient is a 66y old  Male with PMHx of BPH, and Covid+ 1/19/22 who presents with worsening SOB and confusion.  As per wife, pt more SOB than before, and saturating 85% on home pulse ox.  ON admission, he found to have fever, tachycardia, hypoxia, required BIPAP and positive COVID PCR along with bilateral infiltrate on CXR. He has started on Dexamethasone, Cefepime and IV Vancomycin, and the ID consult requested to assist with further evaluation and antibiotic management.    # Severe sepsis ( Fever + Tachycardia + Tacypnea + Hypoxia)  # COVID pneumonitis - severe , O2 saturation 85 %, less than <94 % in Room air)  # Acute Hypoxic Respiratory failure - on BIPAP  # Suspected  superimposed Bacterial pneumonia    would recommend:    1. Please start on Remdesivir since onset of Sx less than 10 days and ALT only 82  2. Monitor ALT , cut off is 6 to 10 times upper normal limit  3. Continue Dexamethasone to complete the course  4. Supportive care including Anticoagulation and Incentive spirometry  5. Obtain Procalcitonin level, MRSA PCR and D-dimer  6. Follow up Blood cultures and c/w Abx until work up is done   7. Management of BIPAP As per CCU protocol  8. COVID precautions    d/w CCU team     will follow the patient with you and make further recommendation based on the clinical course and Lab results  Thank you for the opportunity to participate in Mr. CAMPOS's care      Attending Attestation:    Spent more than 65 minutes on total encounter, more than 50 % of the visit was spent counseling and/or coordinating care by the Attending physician.

## 2022-01-27 NOTE — H&P ADULT - ASSESSMENT
This is a 67 y/o Male with PMHx of BPH, and Covid+ 1/19/22 who presents with worsening SOB and confusion.  Acute Respiratory Failure  Covid 19 - PNA  Hyponatremia  Hypocalcemia  Mild Transaminitis

## 2022-01-27 NOTE — CONSULT NOTE ADULT - ASSESSMENT
65yo male with hx BPH and covid-19 presents to ED for worsening sob and ams. Covid + since 1/19/22. Admitted to CCU.   Today went to Afib with RVR, had respiratory failure, went to [place]ach became pulseless ACLS protocol followed. Intubated now, on propofol, precedex, Vasopressin and amiodarone drip. Converted to sinus rhythm    Plan:  afib/vtac/arrest likely related to respiratory failure  - check cardiac enzymes, ck/ckmd, lipids, tsh  - heparin drip, check PTT Q6hr  - ecg  - replete K  - needs echo  - c/w amiodarone  - c/w pressors  - covid treatment per ID  - strict Is/Os

## 2022-01-27 NOTE — CONSULT NOTE ADULT - SUBJECTIVE AND OBJECTIVE BOX
NEPHROLOGY CONSULTATION NOTE    ADRI CAMPOS  66y  Male  MRN-[id number]    CC:   Patient is a 66y old  Male who presents with a chief complaint of Confusion / Covid 19 / Hyponatremia (2022 11:33)      HPI:  65 y/o Male with PMHx of BPH, and Covid+ 22 who presents with worsening SOB and confusion.  As per wife, pt more SOB today, 85% on home pulse ox.  More altered today than yesterday.  Went to PMD on weekend, received CXR, called yesterday for result of PNA on cxr, given antibx. Pt wife states prior to yesterday patient had no confusion.  No hx of cancer, DVT.  Per family, no other med hx.     (2022 03:09)      PAST MEDICAL & SURGICAL HISTORY:  History of BPH      Allergies:  Allergy Status Unknown    Home Medications Reviewed  Hospital Medications:   MEDICATIONS  (STANDING):  aMIOdarone Infusion 1 mG/Min (33.3 mL/Hr) IV Continuous <Continuous>  aMIOdarone Infusion 0.5 mG/Min (16.7 mL/Hr) IV Continuous <Continuous>  cefepime   IVPB 1000 milliGRAM(s) IV Intermittent every 8 hours  chlorhexidine 0.12% Liquid 15 milliLiter(s) Oral Mucosa every 12 hours  cisatracurium Infusion 3 MICROgram(s)/kG/Min (18.2 mL/Hr) IV Continuous <Continuous>  dexAMETHasone  Injectable 6 milliGRAM(s) IV Push daily  fentaNYL   Infusion. 0.5 MICROgram(s)/kG/Hr (5.05 mL/Hr) IV Continuous <Continuous>  furosemide   Injectable 80 milliGRAM(s) IV Push once  heparin  Infusion 1800 Unit(s)/Hr (18 mL/Hr) IV Continuous <Continuous>  midazolam Infusion 0.02 mG/kG/Hr (2.02 mL/Hr) IV Continuous <Continuous>  norepinephrine Infusion 0.05 MICROgram(s)/kG/Min (4.73 mL/Hr) IV Continuous <Continuous>  sodium bicarbonate  Infusion 0.223 mEq/kG/Hr (150 mL/Hr) IV Continuous <Continuous>  tocilizumab IVPB 600 milliGRAM(s) IV Intermittent once  vancomycin  IVPB 1500 milliGRAM(s) IV Intermittent every 12 hours  vasopressin Infusion 0.04 Unit(s)/Min (2.4 mL/Hr) IV Continuous <Continuous>    MEDICATIONS  (PRN):  acetaminophen     Tablet .. 650 milliGRAM(s) Oral every 4 hours PRN Temp greater or equal to 38.5C (101.3F)      SOCIAL HISTORY:  Social History:  Tobacco use: quit greater than 20 yrs  EtOH use: denies  Illicit drug use: denies  Marital Status:  (2022 03:09)      FAMILY HISTORY:      REVIEW OF SYSTEMS:  Unable to obtain d/t critical illness    VITALS:  T(F): 98.2 (22 @ 15:01), Max: 102.8 (22 @ 23:09)  HR: 123 (22 @ 15:34)  BP: 124/83 (22 @ 15:34)  RR: 30 (22 @ 15:34)  SpO2: 63% (22 @ 15:34)  Mode: AC/ CMV (Assist Control/ Continuous Mandatory Ventilation)  RR (machine): 24  TV (machine): 450  FiO2: 100  PEEP: 17  ITime: 1  MAP: 21  PC: 15  PIP: 30    Height (cm): 180.3 ( @ 06:29)  Weight (kg): 101 ( @ 06:29)  BMI (kg/m2): 31.1 ( @ 06:29)  BSA (m2): 2.21 ( @ 06:29)  I&O's Detail    2022 07:  -  2022 07:00  --------------------------------------------------------  IN:    sodium chloride 3%: 60 mL  Total IN: 60 mL    OUT:  Total OUT: 0 mL    Total NET: 60 mL      2022 07:  -  2022 16:18  --------------------------------------------------------  IN:    Amiodarone: 349.8 mL    Amiodarone: 16.7 mL    Cisatracurium: 162.2 mL    Dexmedetomidine: 100 mL    FentaNYL: 101 mL    Heparin: 72 mL    IV PiggyBack: 500 mL    Midazolam: 13 mL    Norepinephrine: 520 mL    Sodium Bicarbonate: 600 mL    Sodium Chloride 0.9% Bolus: 1000 mL    sodium chloride 3%: 30 mL  Total IN: 3464.7 mL    OUT:    Indwelling Catheter - Urethral (mL): 45 mL    Voided (mL): 200 mL  Total OUT: 245 mL    Total NET: 3219.7 mL        Mode: AC/ CMV (Assist Control/ Continuous Mandatory Ventilation), RR (machine): 24, TV (machine): 450, FiO2: 100, PEEP: 17, ITime: 1, MAP: 21, PC: 15, PIP: 30    I&O's Summary    2022 07:  -  2022 07:00  --------------------------------------------------------  IN: 60 mL / OUT: 0 mL / NET: 60 mL    2022 07:01  -  2022 16:18  --------------------------------------------------------  IN: 3464.7 mL / OUT: 245 mL / NET: 3219.7 mL        PHYSICAL EXAM:  Gen: intubated/ventilated  resp: b/l mechanical breath sounds  card: S1/S2  abd: soft distended    LABS:  Daily Height in cm: 180.34 (2022 06:29)    Daily Weight in k.1 (2022 07:02)  ABG - ( 2022 15:33 )  pH, Arterial: 6.89  pH, Blood: x     /  pCO2: 57    /  pO2: 91    / HCO3: 11    / Base Excess: -22.2 /  SaO2: 94.2      Blood Gas Arterial, Lactate: 14.70 mmol/L (22 @ 15:33)  Blood Gas Arterial - Potassium: 5.4 mmol/L (22 @ 15:33)  Blood Gas Arterial, Lactate: 12.70 mmol/L (22 @ 13:48)        121<L>  |  78<L>  |  20  ----------------------------<  148<H>  6.5<HH>   |  15<L>  |  1.4    Ca    6.6<L>      2022 12:15    TPro  4.7<L>  /  Alb  2.5<L>  /  TBili  0.9  /  DBili      /  AST  715<H>  /  ALT  394<H>  /  AlkPhos  71      Creatinine Trend:   Creatinine, Serum: 1.4 mg/dL (22 @ 12:15)  Creatinine, Serum: 0.8 mg/dL (22 @ 07:30)  Creatinine, Serum: 1.0 mg/dL (22 @ 00:00)                            14.5   14.91 )-----------( 266      ( 2022 07:30 )             40.8     Mean Cell Volume: 79.8 fL (22 @ 07:30)    Urine Studies:  Urinalysis Basic - ( 2022 08:00 )    Color: Yellow / Appearance: Clear / SG: >=1.030 / pH:   Gluc:  / Ketone: 40  / Bili: Negative / Urobili: 1.0 mg/dL   Blood:  / Protein: 100 mg/dL / Nitrite: Negative   Leuk Esterase: Negative / RBC: 6-10 /HPF / WBC Negative   Sq Epi:  / Non Sq Epi: Few /HPF / Bacteria: Few      Sodium, Random Urine: 93.0 mmoL/L ( @ 08:00)  Osmolality, Random Urine: 573 mos/kg ( @ 08:00)            RADIOLOGY & ADDITIONAL STUDIES:        Xray Chest 1 View- PORTABLE-Urgent:   ACC: [id number] EXAM:  XR CHEST PORTABLE URGENT 1V                          PROCEDURE DATE:  2022          INTERPRETATION:  Clinical History / Reason for exam: Sepsis    Comparison : Chest radiograph None.    Technique/Positioning: Frontal chest radiograph.    Findings:    Support devices: None.    Cardiac/mediastinum/hilum: Enlarged cardiac silhouette    Lung parenchyma/Pleura: Extensive patchy bilateral pulmonary opacities.   No pneumothorax.    Skeleton/soft tissues: Unremarkable.    Impression:    Extensive patchy bilateral pulmonary opacities. Follow-up recommended.        --- End of Report ---            LINO DUVAL MD; Attending Radiologist  This document has been electronically signed. 2022  9:07AM (22 @ 00:55)                     NEPHROLOGY CONSULTATION NOTE    ADRI CAMPOS  66y  Male  MRN-[id number]    CC:   Patient is a 66y old  Male who presents with a chief complaint of Confusion / Covid 19 / Hyponatremia (2022 11:33)      HPI:  67 y/o Male with PMHx of BPH, and Covid+ 22 who presents with worsening SOB and confusion.  As per wife, pt more SOB today, 85% on home pulse ox.  More altered today than yesterday.  Went to PMD on weekend, received CXR, called yesterday for result of PNA on cxr, given antibx. Pt wife states prior to yesterday patient had no confusion.  No hx of cancer, DVT.  Per family, no other med hx.     (2022 03:09)      PAST MEDICAL & SURGICAL HISTORY:  History of BPH      Allergies:  Allergy Status Unknown    Home Medications Reviewed  Hospital Medications:   MEDICATIONS  (STANDING):  aMIOdarone Infusion 1 mG/Min (33.3 mL/Hr) IV Continuous <Continuous>  aMIOdarone Infusion 0.5 mG/Min (16.7 mL/Hr) IV Continuous <Continuous>  cefepime   IVPB 1000 milliGRAM(s) IV Intermittent every 8 hours  chlorhexidine 0.12% Liquid 15 milliLiter(s) Oral Mucosa every 12 hours  cisatracurium Infusion 3 MICROgram(s)/kG/Min (18.2 mL/Hr) IV Continuous <Continuous>  dexAMETHasone  Injectable 6 milliGRAM(s) IV Push daily  fentaNYL   Infusion. 0.5 MICROgram(s)/kG/Hr (5.05 mL/Hr) IV Continuous <Continuous>  furosemide   Injectable 80 milliGRAM(s) IV Push once  heparin  Infusion 1800 Unit(s)/Hr (18 mL/Hr) IV Continuous <Continuous>  midazolam Infusion 0.02 mG/kG/Hr (2.02 mL/Hr) IV Continuous <Continuous>  norepinephrine Infusion 0.05 MICROgram(s)/kG/Min (4.73 mL/Hr) IV Continuous <Continuous>  sodium bicarbonate  Infusion 0.223 mEq/kG/Hr (150 mL/Hr) IV Continuous <Continuous>  tocilizumab IVPB 600 milliGRAM(s) IV Intermittent once  vancomycin  IVPB 1500 milliGRAM(s) IV Intermittent every 12 hours  vasopressin Infusion 0.04 Unit(s)/Min (2.4 mL/Hr) IV Continuous <Continuous>    MEDICATIONS  (PRN):  acetaminophen     Tablet .. 650 milliGRAM(s) Oral every 4 hours PRN Temp greater or equal to 38.5C (101.3F)      SOCIAL HISTORY:  Social History:  Tobacco use: quit greater than 20 yrs  EtOH use: denies  Illicit drug use: denies  Marital Status:  (2022 03:09)      FAMILY HISTORY:      REVIEW OF SYSTEMS:  Unable to obtain d/t critical illness    VITALS:  T(F): 98.2 (22 @ 15:01), Max: 102.8 (22 @ 23:09)  HR: 123 (22 @ 15:34)  BP: 124/83 (22 @ 15:34)  RR: 30 (22 @ 15:34)  SpO2: 63% (22 @ 15:34)  Mode: AC/ CMV (Assist Control/ Continuous Mandatory Ventilation)  RR (machine): 24  TV (machine): 450  FiO2: 100  PEEP: 17  ITime: 1  MAP: 21  PC: 15  PIP: 30    Height (cm): 180.3 ( @ 06:29)  Weight (kg): 101 ( @ 06:29)  BMI (kg/m2): 31.1 ( @ 06:29)  BSA (m2): 2.21 ( @ 06:29)  I&O's Detail    2022 07:  -  2022 07:00  --------------------------------------------------------  IN:    sodium chloride 3%: 60 mL  Total IN: 60 mL    OUT:  Total OUT: 0 mL    Total NET: 60 mL      2022 07:  -  2022 16:18  --------------------------------------------------------  IN:    Amiodarone: 349.8 mL    Amiodarone: 16.7 mL    Cisatracurium: 162.2 mL    Dexmedetomidine: 100 mL    FentaNYL: 101 mL    Heparin: 72 mL    IV PiggyBack: 500 mL    Midazolam: 13 mL    Norepinephrine: 520 mL    Sodium Bicarbonate: 600 mL    Sodium Chloride 0.9% Bolus: 1000 mL    sodium chloride 3%: 30 mL  Total IN: 3464.7 mL    OUT:    Indwelling Catheter - Urethral (mL): 45 mL    Voided (mL): 200 mL  Total OUT: 245 mL    Total NET: 3219.7 mL        Mode: AC/ CMV (Assist Control/ Continuous Mandatory Ventilation), RR (machine): 24, TV (machine): 450, FiO2: 100, PEEP: 17, ITime: 1, MAP: 21, PC: 15, PIP: 30    I&O's Summary    2022 07:  -  2022 07:00  --------------------------------------------------------  IN: 60 mL / OUT: 0 mL / NET: 60 mL    2022 07:  -  2022 16:18  --------------------------------------------------------  IN: 3464.7 mL / OUT: 245 mL / NET: 3219.7 mL        PHYSICAL EXAM:  Gen: intubated/ventilated  resp: b/l mechanical breath sounds  card: tachycardic  abd: soft distended    LABS:  Daily Height in cm: 180.34 (2022 06:29)    Daily Weight in k.1 (2022 07:02)  ABG - ( 2022 15:33 )  pH, Arterial: 6.89  pH, Blood: x     /  pCO2: 57    /  pO2: 91    / HCO3: 11    / Base Excess: -22.2 /  SaO2: 94.2      Blood Gas Arterial, Lactate: 14.70 mmol/L (22 @ 15:33)  Blood Gas Arterial - Potassium: 5.4 mmol/L (22 @ 15:33)  Blood Gas Arterial, Lactate: 12.70 mmol/L (22 @ 13:48)        121<L>  |  78<L>  |  20  ----------------------------<  148<H>  6.5<HH>   |  15<L>  |  1.4    Ca    6.6<L>      2022 12:15    TPro  4.7<L>  /  Alb  2.5<L>  /  TBili  0.9  /  DBili      /  AST  715<H>  /  ALT  394<H>  /  AlkPhos  71      Creatinine Trend:   Creatinine, Serum: 1.4 mg/dL (22 @ 12:15)  Creatinine, Serum: 0.8 mg/dL (22 @ 07:30)  Creatinine, Serum: 1.0 mg/dL (22 @ 00:00)                            14.5   14.91 )-----------( 266      ( 2022 07:30 )             40.8     Mean Cell Volume: 79.8 fL (22 @ 07:30)    Urine Studies:  Urinalysis Basic - ( 2022 08:00 )    Color: Yellow / Appearance: Clear / SG: >=1.030 / pH:   Gluc:  / Ketone: 40  / Bili: Negative / Urobili: 1.0 mg/dL   Blood:  / Protein: 100 mg/dL / Nitrite: Negative   Leuk Esterase: Negative / RBC: 6-10 /HPF / WBC Negative   Sq Epi:  / Non Sq Epi: Few /HPF / Bacteria: Few      Sodium, Random Urine: 93.0 mmoL/L ( @ 08:00)  Osmolality, Random Urine: 573 mos/kg ( @ 08:00)            RADIOLOGY & ADDITIONAL STUDIES:        Xray Chest 1 View- PORTABLE-Urgent:   ACC: [id number] EXAM:  XR CHEST PORTABLE URGENT 1V                          PROCEDURE DATE:  2022          INTERPRETATION:  Clinical History / Reason for exam: Sepsis    Comparison : Chest radiograph None.    Technique/Positioning: Frontal chest radiograph.    Findings:    Support devices: None.    Cardiac/mediastinum/hilum: Enlarged cardiac silhouette    Lung parenchyma/Pleura: Extensive patchy bilateral pulmonary opacities.   No pneumothorax.    Skeleton/soft tissues: Unremarkable.    Impression:    Extensive patchy bilateral pulmonary opacities. Follow-up recommended.        --- End of Report ---            LINO DUVAL MD; Attending Radiologist  This document has been electronically signed. 2022  9:07AM (22 @ 00:55)

## 2022-01-27 NOTE — ED ADULT NURSE REASSESSMENT NOTE - NS ED NURSE REASSESS COMMENT FT1
PT Alert but not oriented. Follows commands. PT will follow with eyes but nonverbal.   Unable to confirm allergy status.  As per wife PT has no medical history, takes no medication, usually A and O X4, takes good care of himself. Questioned patients spouse on allergies to food or medicine spouse states she is unsure .  Informed Receiving RN issues in recording medical history. Bed alarms active. Safety and comfort maintained.

## 2022-01-27 NOTE — CONSULT NOTE ADULT - ASSESSMENT
IMPRESSION:  Severe respiratory fail;ure  severe Covid PNA  Hyponatremia(severe) s/p 3%.  CPA after IMV s/p 7 mins of CPR to  ROSC.    PLAN:    CNS: Start versed, dilaudid pushes and prn, start nimbex 20mg IV push and then a drip  BIS 40-60,     HEENT:  Oral care    PULMONARY:  HOB @ 45 degrees, start PEEP of10, 100% FIo2, TV of 450, RR 24, increase FDio2 q15 mins until 16 if BP toelrated, check ABGs stat and in 1 hours.  A-line, central line    CARDIOVASCULAR: check EKG, trops, ckmb, Cardiology eval, Echo,      GI: GI prophylaxis                                          Feeding NPO for now    RENAL:  F/u  lytes.  Correct as needed. accurate I/O    INFECTIOUS DISEASE: Conitnue abx , check nasal MRSA, will need toci.  no benefit of pramod at this point.    HEMATOLOGICAL:  DVT prophylaxis. lovenox BID    ENDOCRINE:  Follow up FS.  Insulin protocol if needed.    MUSCULOSKELETAL: bed rest    CODE STATUS: FULL CODE    DISPOSITION: Pt requires continued monitoring in the MICU     IMPRESSION:  Severe respiratory fail;ure  severe Covid PNA  Hyponatremia(severe) s/p 3%.  CPA after IMV s/p 7 mins of CPR to  ROSC.  Afib   PLAN:    CNS: Start versed, dilaudid pushes and prn, start nimbex 20mg IV push and then a drip  BIS 40-60,     HEENT:  Oral care    PULMONARY:  HOB @ 45 degrees, start PEEP of10, 100% FIo2, TV of 450, RR 24, increase FDio2 q15 mins until 16 if BP toelrated, check ABGs stat and in 1 hours.  A-line, central line    CARDIOVASCULAR: check EKG, trops, ckmb, Cardiology eval, Echo,      GI: GI prophylaxis                                          Feeding NPO for now    RENAL:  F/u  lytes.  Correct as needed. accurate I/O    INFECTIOUS DISEASE: Conitnue abx , check nasal MRSA, will need toci.  no benefit of pramod at this point.    HEMATOLOGICAL:  DVT prophylaxis. lovenox BID    ENDOCRINE:  Follow up FS.  Insulin protocol if needed.    MUSCULOSKELETAL: bed rest    CODE STATUS: FULL CODE    DISPOSITION: Pt requires continued monitoring in the MICU

## 2022-01-27 NOTE — H&P ADULT - NSHPREVIEWOFSYSTEMS_GEN_ALL_CORE
REVIEW OF SYSTEMS:    CONSTITUTIONAL: No weakness, fevers or chills; confusion - see HPI  EYES/ENT: No visual changes;  No vertigo or throat pain   NECK: No pain or stiffness  RESPIRATORY: HPI  CARDIOVASCULAR: No chest pain or palpitations  GASTROINTESTINAL: No abdominal or epigastric pain. No nausea, vomiting, or hematemesis; No diarrhea or constipation. No melena or hematochezia.  GENITOURINARY: No dysuria, frequency or hematuria  NEUROLOGICAL: No numbness or weakness  SKIN: No itching, rashes

## 2022-01-27 NOTE — CONSULT NOTE ADULT - ASSESSMENT
66M with PMH of BPH, COVID19+ on 1/19/22 who presents with worsening SOB and confusion c/b cardiac arrest.  Nephrology consulted for TUYET and severe electrolyte disturbances.      # Acute hypoxic respiratory failure  # Cardiopulmonary arrest x3  # TUYET / ATN  # HAGMA / severe lactic acidosis  # Hyponatremia  # Afib    - on MV with FiO2 100, PEEP 17 / remains hypoxemic  - requiring high dose vasopressor support  - minimal urine output  - lactate severely rising  - Na level up-trending    Recommendations:  - pt too unstable hemodynamically for RRT;  can attempt continuous RRT if hemodynamics/oxygenation stabilizes  - dose meds for eGFR <10.  D/c standing vancomycin- should be dosed by level.  Cefepime should be dosed once daily  - urine output did not improve with lasix 80mg iv; can try bumex 3mg iv if pressor requirement improves  - cont strict i/o  - bicarb push as needed for pH < 7.1  Grave prognosis  GOC  66M with PMH of BPH, COVID19+ on 1/19/22 who presents with worsening SOB and confusion c/b cardiac arrest.  Nephrology consulted for TUYET and severe electrolyte disturbances.      # Acute hypoxic respiratory failure  # Cardiopulmonary arrest x3  # TUYET / ATN  # HAGMA / severe lactic acidosis  # Hyponatremia  # Afib    - on MV with FiO2 100, PEEP 17 / remains hypoxemic  - requiring high dose vasopressor support  - minimal urine output  - lactate severely rising  - Na level up-trending    Recommendations:  - pt too unstable hemodynamically for RRT;  can attempt continuous RRT if hemodynamics/oxygenation stabilizes  - dose meds for eGFR <10.  D/c standing vancomycin- should be dosed by level.  Cefepime should be dosed once daily  - urine output did not improve with lasix 80mg iv; can try bumex 3mg iv if pressor requirement improves  - cont strict i/o  - bicarb push as needed for pH < 7.1  - will be difficult to avoid sodium correction at recommended rate of 6-8 in the first 24 hrs d/t necessity for fluid resuscitation and drips containing sodium.  Use d5w with drips whenever possible  Grave prognosis  GOC  66M with PMH of BPH, COVID19+ on 1/19/22 who presents with worsening SOB and confusion c/b cardiac arrest.  Nephrology consulted for TUYET and severe electrolyte disturbances.      # Acute hypoxic respiratory failure  # Cardiopulmonary arrest x3  # TUYET / ATN  # HAGMA / severe lactic acidosis  # Hyponatremia  # Afib    - on MV with FiO2 100, PEEP 17 / remains hypoxemic  - requiring high dose vasopressor support  - minimal urine output  - lactate severely rising  - Na level up-trending    Recommendations:  - pt too hemodynamically unstable for RRT d/t multiple cardiac arrests today and requiring high dose vasopressors;  can attempt continuous RRT if hemodynamics/oxygenation stabilizes  - dose meds for eGFR <10.  D/c standing vancomycin- should be dosed by level.  Cefepime should be dosed once daily  - urine output did not improve with lasix 80mg iv; can try bumex 3mg iv if pressor requirement stabilizes or decreased  - cont strict i/o  - bicarb push as needed for pH < 7.1  - will be difficult to avoid sodium correction at recommended rate of 6-8 in the first 24 hrs d/t necessity for fluid resuscitation and drips containing sodium.  Use d5w with drips whenever possible  Grave prognosis  GOC

## 2022-01-27 NOTE — H&P ADULT - PROBLEM SELECTOR PLAN 1
Admit to inpatient CCU Crit bed  Start IV decadron  ID Consult  Remdesinivir  Darya Admit to inpatient CCU Crit   Start IV decadron  ID Consult  Remdesinivir  D-Dimer, Procalcitonin,   VTE prophylaxis  IV Abx

## 2022-01-27 NOTE — ED PROVIDER NOTE - CARE PLAN
Principal Discharge DX:	Pneumonia  Secondary Diagnosis:	COVID-19  Secondary Diagnosis:	Hyponatremia  Secondary Diagnosis:	Sepsis   1

## 2022-01-27 NOTE — CONSULT NOTE ADULT - REASON FOR ADMISSION
Confusion / Covid 19 / Hyponatremia

## 2022-01-27 NOTE — CONSULT NOTE ADULT - SUBJECTIVE AND OBJECTIVE BOX
Patient is a 66y old  Male with PMHx of BPH, and Covid+ 1/19/22 who presents with worsening SOB and confusion.  As per wife, pt more SOB than before, and saturating 85% on home pulse ox.  ON admission, he found to have fever, tachycardia, hypoxia, required BIPAP and positive COVID PCR along with bilateral infiltrate on CXR. He has started on Dexamethasone, Cefepime and IV Vancomycin, and the ID consult requested to assist with further evaluation and antibiotic management.      REVIEW OF SYSTEMS: As per  HPI      PAST MEDICAL & SURGICAL HISTORY:  History of BPH      SOCIAL HISTORY  Alcohol: Does not drink  Tobacco: Does not smoke  Illicit substance use: None      FAMILY HISTORY: Non contributory to the present illness        ALLERGIES:  Status Unknown        PHYSICAL EXAM:  ICU Vital Signs Last 24 Hrs  T(C): 36.8 (27 Jan 2022 07:02), Max: 39.3 (26 Jan 2022 23:09)  T(F): 98.2 (27 Jan 2022 07:02), Max: 102.8 (26 Jan 2022 23:09)  HR: 109 (27 Jan 2022 06:29) (103 - 109)  BP: 163/97 (27 Jan 2022 06:29) (141/90 - 163/97)  BP(mean): 124 (27 Jan 2022 06:29) (124 - 124)  ABP: --  ABP(mean): --  RR: 24 (27 Jan 2022 07:02) (20 - 26)  SpO2: 91% (27 Jan 2022 06:29) (85% - 94%)        LABS:                        12.8   11.05 )-----------( 276      ( 27 Jan 2022 00:00 )             35.9       01-27    112<LL>  |  77<L>  |  18  ----------------------------<  147<H>  3.8   |  19  |  1.0    Ca    6.6<L>      27 Jan 2022 00:00    TPro  5.2<L>  /  Alb  2.7<L>  /  TBili  0.8  /  DBili  x   /  AST  231<H>  /  ALT  82<H>  /  AlkPhos  44  01-27    PT/INR - ( 27 Jan 2022 00:00 )   PT: 15.70 sec;   INR: 1.37 ratio     PTT - ( 27 Jan 2022 00:00 )  PTT:28.6 sec        MEDICATIONS  (STANDING):  cefepime   IVPB 1000 milliGRAM(s) IV Intermittent every 8 hours  dexAMETHasone  Injectable 6 milliGRAM(s) IV Push daily  enoxaparin Injectable 40 milliGRAM(s) SubCutaneous daily  sodium chloride 0.9% Bolus 1000 milliLiter(s) IV Bolus once  sodium chloride 3%. 90 milliLiter(s) (30 mL/Hr) IV Continuous <Continuous>  vancomycin  IVPB 1500 milliGRAM(s) IV Intermittent every 12 hours    MEDICATIONS  (PRN):  acetaminophen     Tablet .. 650 milliGRAM(s) Oral every 4 hours PRN Temp greater or equal to 38.5C (101.3F)      RADIOLOGY & ADDITIONAL TESTS:    COVID-19 PCR (01.27.22 @ 00:00)   COVID-19 PCR: Detected

## 2022-01-27 NOTE — ED PROVIDER NOTE - ATTENDING CONTRIBUTION TO CARE
67 yo m with covid c/o sob, diff breathing.   pt in mod distress 2/2 resp distress  alert, airway int  b/l ronchi  s1s2, ab soft, nt  nfd    place on bipap, labs, imaging, supportive care

## 2022-01-27 NOTE — CONSULT NOTE ADULT - SUBJECTIVE AND OBJECTIVE BOX
Patient is a 66y old  Male who presents with a chief complaint of Confusion / Covid 19 / Hyponatremia (27 Jan 2022 07:42)      HPI:  65 y/o Male with PMHx of BPH, and Covid+ 1/19/22 who presents with worsening SOB and confusion.  As per wife, pt more SOB today, 85% on home pulse ox.  More altered today than yesterday.  Went to PMD on weekend, received CXR, called yesterday for result of PNA on cxr, given antibx. Pt wife states prior to yesterday patient had no confusion.  No hx of cancer, DVT.  Per family, no other med hx.     (27 Jan 2022 03:09)      PAST MEDICAL & SURGICAL HISTORY:  History of BPH        SOCIAL HX:   Smoking former smoker                        ETOH       -ve                     Other  -ve    FAMILY HISTORY:  :  No known cardiovacular family hisotry     ROS:  See HPI     Allergies    Allergy Status Unknown    Intolerances          PHYSICAL EXAM    ICU Vital Signs Last 24 Hrs  T(C): 36.8 (27 Jan 2022 07:02), Max: 39.3 (26 Jan 2022 23:09)  T(F): 98.2 (27 Jan 2022 07:02), Max: 102.8 (26 Jan 2022 23:09)  HR: 113 (27 Jan 2022 07:30) (103 - 113)  BP: 161/90 (27 Jan 2022 07:30) (141/90 - 163/97)  BP(mean): 117 (27 Jan 2022 07:30) (117 - 124)  RR: 34 (27 Jan 2022 09:28) (20 - 39)  SpO2: hypoxic on HFNC, intoerant to NIV. (27 Jan 2022 09:28) (85% - 99%)      General: severe respiratory distress, confused, tachypneic , tachycardic  HEENT:  PALMIRA              Lungs: Bilateral BS  Cardiovascular: irregular, tachycardic  Gastrointestinal: Soft, Positive BS  Musculoskeletal: No clubbing.  Moves all extremities.  Full range of motion   Skin: Warm.  Intact  Neurological: No motor or sensory deficit       01-26-22 @ 07:01  -  01-27-22 @ 07:00  --------------------------------------------------------  IN:    sodium chloride 3%: 60 mL  Total IN: 60 mL    OUT:  Total OUT: 0 mL    Total NET: 60 mL      01-27-22 @ 07:01  -  01-27-22 @ 09:52  --------------------------------------------------------  IN:  Total IN: 0 mL    OUT:    Voided (mL): 200 mL  Total OUT: 200 mL    Total NET: -200 mL          LABS:                          14.5   14.91 )-----------( 266      ( 27 Jan 2022 07:30 )             40.8                                               01-27    116<LL>  |  80<L>  |  17  ----------------------------<  172<H>  3.4<L>   |  21  |  0.8    Ca    7.2<L>      27 Jan 2022 07:30    TPro  5.2<L>  /  Alb  2.7<L>  /  TBili  0.8  /  DBili  x   /  AST  231<H>  /  ALT  82<H>  /  AlkPhos  44  01-27      PT/INR - ( 27 Jan 2022 00:00 )   PT: 15.70 sec;   INR: 1.37 ratio         PTT - ( 27 Jan 2022 00:00 )  PTT:28.6 sec                                       Urinalysis Basic - ( 27 Jan 2022 08:00 )    Color: Yellow / Appearance: Clear / SG: >=1.030 / pH: x  Gluc: x / Ketone: 40  / Bili: Negative / Urobili: 1.0 mg/dL   Blood: x / Protein: 100 mg/dL / Nitrite: Negative   Leuk Esterase: Negative / RBC: x / WBC x   Sq Epi: x / Non Sq Epi: x / Bacteria: x                                                  LIVER FUNCTIONS - ( 27 Jan 2022 00:00 )  Alb: 2.7 g/dL / Pro: 5.2 g/dL / ALK PHOS: 44 U/L / ALT: 82 U/L / AST: 231 U/L / GGT: x                                                                                                                                       X-Rays : B/l opacities                                                                                    ECHO    MEDICATIONS  (STANDING):  aMIOdarone Infusion 1 mG/Min (33.3 mL/Hr) IV Continuous <Continuous>  aMIOdarone Infusion 0.5 mG/Min (16.7 mL/Hr) IV Continuous <Continuous>  aMIOdarone IVPB 150 milliGRAM(s) IV Intermittent once  cefepime   IVPB 1000 milliGRAM(s) IV Intermittent every 8 hours  dexAMETHasone  Injectable 6 milliGRAM(s) IV Push daily  dexMEDEtomidine Infusion 0.05 MICROgram(s)/kG/Hr (1.26 mL/Hr) IV Continuous <Continuous>  enoxaparin Injectable 40 milliGRAM(s) SubCutaneous daily  fentaNYL    Injectable 50 MICROGram(s) IV Push once  metoprolol tartrate Injectable 5 milliGRAM(s) IV Push once  vancomycin  IVPB 1500 milliGRAM(s) IV Intermittent every 12 hours    MEDICATIONS  (PRN):  acetaminophen     Tablet .. 650 milliGRAM(s) Oral every 4 hours PRN Temp greater or equal to 38.5C (101.3F)         Patient is a 66y old  Male who presents with a chief complaint of Confusion / Covid 19 / Hyponatremia (27 Jan 2022 07:42)      HPI:  67 y/o Male with PMHx of BPH, and Covid+ 1/19/22 who presents with worsening SOB and confusion.  As per wife, pt more SOB today, 85% on home pulse ox.  More altered today than yesterday.  Went to PMD on weekend, received CXR, called yesterday for result of PNA on cxr, given antibx. Pt wife states prior to yesterday patient had no confusion.  No hx of cancer, DVT.  Per family, no other med hx.  patient seen after cardia arrest and intubation     PAST MEDICAL & SURGICAL HISTORY:  History of BPH        SOCIAL HX:   Smoking former smoker                        ETOH       -ve                     Other  -ve    FAMILY HISTORY:  :  No known cardiovacular family hisotry     ROS:  See HPI     Allergies    Allergy Status Unknown    Intolerances          PHYSICAL EXAM    ICU Vital Signs Last 24 Hrs  T(C): 36.8 (27 Jan 2022 07:02), Max: 39.3 (26 Jan 2022 23:09)  T(F): 98.2 (27 Jan 2022 07:02), Max: 102.8 (26 Jan 2022 23:09)  HR: 113 (27 Jan 2022 07:30) (103 - 113)  BP: 161/90 (27 Jan 2022 07:30) (141/90 - 163/97)  BP(mean): 117 (27 Jan 2022 07:30) (117 - 124)  RR: 34 (27 Jan 2022 09:28) (20 - 39)  SpO2: hypoxic on HFNC, intoerant to NIV. (27 Jan 2022 09:28) (85% - 99%)      General: severe respiratory distress, confused, tachypneic , tachycardic  HEENT:  PALMIRA              Lungs: Bilateral BS  Cardiovascular: irregular, tachycardic  Gastrointestinal: Soft, Positive BS  Musculoskeletal: No clubbing.  Moves all extremities.  Full range of motion   Skin: Warm.  Intact  Neurological: No motor or sensory deficit       01-26-22 @ 07:01  -  01-27-22 @ 07:00  --------------------------------------------------------  IN:    sodium chloride 3%: 60 mL  Total IN: 60 mL    OUT:  Total OUT: 0 mL    Total NET: 60 mL      01-27-22 @ 07:01  -  01-27-22 @ 09:52  --------------------------------------------------------  IN:  Total IN: 0 mL    OUT:    Voided (mL): 200 mL  Total OUT: 200 mL    Total NET: -200 mL          LABS:                          14.5   14.91 )-----------( 266      ( 27 Jan 2022 07:30 )             40.8                                               01-27    116<LL>  |  80<L>  |  17  ----------------------------<  172<H>  3.4<L>   |  21  |  0.8    Ca    7.2<L>      27 Jan 2022 07:30    TPro  5.2<L>  /  Alb  2.7<L>  /  TBili  0.8  /  DBili  x   /  AST  231<H>  /  ALT  82<H>  /  AlkPhos  44  01-27      PT/INR - ( 27 Jan 2022 00:00 )   PT: 15.70 sec;   INR: 1.37 ratio         PTT - ( 27 Jan 2022 00:00 )  PTT:28.6 sec                                       Urinalysis Basic - ( 27 Jan 2022 08:00 )    Color: Yellow / Appearance: Clear / SG: >=1.030 / pH: x  Gluc: x / Ketone: 40  / Bili: Negative / Urobili: 1.0 mg/dL   Blood: x / Protein: 100 mg/dL / Nitrite: Negative   Leuk Esterase: Negative / RBC: x / WBC x   Sq Epi: x / Non Sq Epi: x / Bacteria: x                                                  LIVER FUNCTIONS - ( 27 Jan 2022 00:00 )  Alb: 2.7 g/dL / Pro: 5.2 g/dL / ALK PHOS: 44 U/L / ALT: 82 U/L / AST: 231 U/L / GGT: x                                                                                                                                       X-Rays : B/l opacities                                                                                    ECHO    MEDICATIONS  (STANDING):  aMIOdarone Infusion 1 mG/Min (33.3 mL/Hr) IV Continuous <Continuous>  aMIOdarone Infusion 0.5 mG/Min (16.7 mL/Hr) IV Continuous <Continuous>  aMIOdarone IVPB 150 milliGRAM(s) IV Intermittent once  cefepime   IVPB 1000 milliGRAM(s) IV Intermittent every 8 hours  dexAMETHasone  Injectable 6 milliGRAM(s) IV Push daily  dexMEDEtomidine Infusion 0.05 MICROgram(s)/kG/Hr (1.26 mL/Hr) IV Continuous <Continuous>  enoxaparin Injectable 40 milliGRAM(s) SubCutaneous daily  fentaNYL    Injectable 50 MICROGram(s) IV Push once  metoprolol tartrate Injectable 5 milliGRAM(s) IV Push once  vancomycin  IVPB 1500 milliGRAM(s) IV Intermittent every 12 hours    MEDICATIONS  (PRN):  acetaminophen     Tablet .. 650 milliGRAM(s) Oral every 4 hours PRN Temp greater or equal to 38.5C (101.3F)

## 2022-01-28 NOTE — DISCHARGE NOTE FOR THE EXPIRED PATIENT - HOSPITAL COURSE
65 y/o Male with PMHx of BPH, and Covid+ 22 who presents with worsening SOB and confusion.  As per wife, pt more SOB today, 85% on home pulse ox.  More altered today than yesterday.  Went to PMD on weekend, received CXR, called yesterday for result of PNA on cxr, given antibx. Pt wife states prior to yesterday patient had no confusion.  No hx of cancer, DVT.  Per family, no other med hx.  pt admitted to icu for severe covid pneumonia, required intubation, his condition deteriorated and had multiorgan failure, he had 4 code blue and today was found pulseless, ACLS protocol initiated pt was pronounced  at 17:06

## 2022-01-28 NOTE — PROGRESS NOTE ADULT - ATTENDING COMMENTS
patient seen and examined agree above note   increase bicarb drip for now   follow renal consult   patient 2 pressors , Levophid double concentrated   CVVH will be futile   follow ID   increase rate to 34

## 2022-01-28 NOTE — CHART NOTE - NSCHARTNOTEFT_GEN_A_CORE
Cardiac Arrest    Pt had PEA, Code blue initiated, pt has had ~ 5 Code Blue's today.  Epi given, bicarb given and calcium given ( see code sheet for # of epinephrine's administered) +ROSC, It was explained to the patients wife, that his prognosis is grave and will likely not survive this hospitalization let alone the evening.  She wants efforts to continue as family is coming from [place].
code blue called as pt was found to have no pulse in the monitor, on exam pt had no pulse, ACLS protocol initiated, pt was given 3 Epi, 2 Amp of bicarb, 2 gram of Ca gluconate,  Rhythm  remained PEA, pt was pronounced  at 17:06

## 2022-01-28 NOTE — PROGRESS NOTE ADULT - SUBJECTIVE AND OBJECTIVE BOX
ADRI CAMPOS  66y, Male  Allergy: Allergy Status Unknown      CHIEF COMPLAINT: Confusion / Covid 19 / Hyponatremia (28 Jan 2022 09:52)      HPI:  65 y/o Male with PMHx of BPH, and Covid+ 1/19/22 who presents with worsening SOB and confusion.  As per wife, pt more SOB today, 85% on home pulse ox.  More altered today than yesterday.  Went to PMD on weekend, received CXR, called yesterday for result of PNA on cxr, given antibx. Pt wife states prior to yesterday patient had no confusion.  No hx of cancer, DVT.  Per family, no other med hx.     (27 Jan 2022 03:09)    HPI:    FAMILY HISTORY:    PAST MEDICAL & SURGICAL HISTORY:  History of BPH        SOCIAL HISTORY  Social History:  Tobacco use: quit greater than 20 yrs  EtOH use: denies  Illicit drug use: denies  Marital Status:  (27 Jan 2022 03:09)        ROS  General: Denies fevers, chills, nightsweats, weight loss  HEENT: Denies headache, rhinorrhea, sore throat, eye pain  CV: Denies CP, palpitations  PULM: Denies SOB, cough  GI: Denies abdominal pain, diarrhea  : Denies dysuria, hematuria  MSK: Denies arthralgias  SKIN: Denies rash   NEURO: Denies paresthesias, weakness  PSYCH: Denies depression    VITALS:  T(F): 95.9, Max: 96.3 (01-28-22 @ 05:30)  HR: 95  BP: 120/72  RR: 34Vital Signs Last 24 Hrs  T(C): 35.5 (28 Jan 2022 15:00), Max: 35.7 (28 Jan 2022 05:30)  T(F): 95.9 (28 Jan 2022 15:00), Max: 96.3 (28 Jan 2022 05:30)  HR: 95 (28 Jan 2022 16:30) (83 - 110)  BP: 120/72 (28 Jan 2022 07:00) (100/65 - 125/68)  BP(mean): 91 (28 Jan 2022 05:00) (78 - 91)  RR: 34 (28 Jan 2022 16:30) (23 - 34)  SpO2: 100% (28 Jan 2022 16:00) (88% - 100%)    PHYSICAL EXAM: seen 1030am on 1/28  intubated  sedated  [id number]  bilat air entry    TESTS & MEASUREMENTS:                        12.6   32.81 )-----------( 64       ( 28 Jan 2022 06:06 )             39.7     01-28    126<L>  |  78<L>  |  26<H>  ----------------------------<  154<H>  5.4<H>   |  9<LL>  |  3.2<H>    Ca    6.9<L>      28 Jan 2022 06:06  Phos  16.7     01-28  Mg     2.7     01-28    TPro  4.0<L>  /  Alb  1.9<L>  /  TBili  1.7<H>  /  DBili  x   /  AST  21012  /  ALT  8852  /  AlkPhos  445<H>  01-28    eGFR if Non African American: 19 mL/min/1.73M2 (01-28-22 @ 06:06)  eGFR if African American: 22 mL/min/1.73M2 (01-28-22 @ 06:06)  eGFR if Non African American: 26 mL/min/1.73M2 (01-27-22 @ 19:50)  eGFR if African American: 30 mL/min/1.73M2 (01-27-22 @ 19:50)    LIVER FUNCTIONS - ( 28 Jan 2022 06:06 )  Alb: 1.9 g/dL / Pro: 4.0 g/dL / ALK PHOS: 445 U/L / ALT: 8852 U/L / AST: 23125 U/L / GGT: x           Urinalysis Basic - ( 27 Jan 2022 08:00 )    Color: Yellow / Appearance: Clear / SG: >=1.030 / pH: x  Gluc: x / Ketone: 40  / Bili: Negative / Urobili: 1.0 mg/dL   Blood: x / Protein: 100 mg/dL / Nitrite: Negative   Leuk Esterase: Negative / RBC: 6-10 /HPF / WBC Negative   Sq Epi: x / Non Sq Epi: Few /HPF / Bacteria: Few        Culture - Urine (collected 01-27-22 @ 08:00)  Source: Clean Catch Clean Catch (Midstream)  Final Report (01-28-22 @ 15:17):    No growth        Lactate, Blood: 18.2 mmol/L (01-27-22 @ 19:50)  Blood Gas Venous - Lactate: 2.40 mmol/L (01-26-22 @ 23:44)    QRS axis to [] ° and NSR at a rate of [] BPM. There was no atrial enlargement. There was no ventricular hypertrophy. There were no ST-T changes and all intervals were normal.      INFECTIOUS DISEASES TESTING      RADIOLOGY & ADDITIONAL TESTS:  I have personally reviewed the last Chest xray  CXR  Xray Chest 1 View- PORTABLE-Urgent:   ACC: [id number] EXAM:  XR CHEST PORTABLE URGENT 1V                          PROCEDURE DATE:  01/27/2022          INTERPRETATION:  Clinical History / Reason for exam: Sepsis    Comparison : Chest radiograph None.    Technique/Positioning: Frontal chest radiograph.    Findings:    Support devices: None.    Cardiac/mediastinum/hilum: Enlarged cardiac silhouette    Lung parenchyma/Pleura: Extensive patchy bilateral pulmonary opacities.   No pneumothorax.    Skeleton/soft tissues: Unremarkable.    Impression:    Extensive patchy bilateral pulmonary opacities. Follow-up recommended.        --- End of Report ---            LINO DUVAL MD; Attending Radiologist  This document has been electronically signed. Jan 27 2022  9:07AM (01-27-22 @ 00:55)      CT      CARDIOLOGY TESTING  12 Lead ECG:   Ventricular Rate 110 BPM    Atrial Rate 326 BPM    QRS Duration 94 ms    Q-T Interval 322 ms    QTC Calculation(Bazett) 435 ms    R Axis 51 degrees    T Axis 58 degrees    Diagnosis Line Atrial fibrillation with rapid ventricular response    Confirmed by SHELLEY LUNA MD ([id number]) on 1/27/2022 11:29:15 AM (01-26-22 @ 23:07)      MEDICATIONS  aMIOdarone Infusion 1  aMIOdarone Infusion 0.5  ascorbic acid 500  cefepime   IVPB 1000  chlorhexidine 0.12% Liquid 15  cisatracurium Infusion 3  dexAMETHasone  Injectable 6  fentaNYL   Infusion. 0.5  midazolam Infusion 0.02  norepinephrine Infusion 0.05  sodium bicarbonate  Infusion 0.223  vasopressin Infusion 0.04      ANTIBIOTICS:  cefepime   IVPB 1000 milliGRAM(s) IV Intermittent daily      All available historical data has been reviewed    ASSESSMENT  66y M admitted with PNEUMONIA; COVID-19; HYPONATREMIA; SEPSIS        PROBLEMS

## 2022-01-28 NOTE — PROGRESS NOTE ADULT - REASON FOR ADMISSION
Confusion / Covid 19 / Hyponatremia
Confusion / Covid 19 / Hyponatremia
Covid 19 / Hyponatremia
Confusion / Covid 19 / Hyponatremia

## 2022-01-28 NOTE — PROGRESS NOTE ADULT - ASSESSMENT
IMPRESSION:  Severe respiratory fail;ure  severe Covid PNA  Hyponatremia(severe) s/p 3%.  CPA x3 after IMV s/p 7 mins of CPR to  ROSC., 2 codes  3 mins and 1 min  Afib     PLAN:    CNS: assess MS off sedation  avoid temp above 37.7.    HEENT:  Oral care    PULMONARY:  HOB @ 45 degrees, PEEP of 17, 100% FIo2, TV of 450, RR 34      CARDIOVASCULAR: EF <20%, 120 of lasix challenge       GI: GI prophylaxis                                          Feeding NPO for now    RENAL:  F/u  lytes.  Correct as needed. accurate I/O, follow up renal.    INFECTIOUS DISEASE: Conitnue abx  no benefit of pramod at this point.    HEMATOLOGICAL:  DVT prophylaxis. lovenox BID    ENDOCRINE:  Follow up FS.  Insulin protocol if needed.    MUSCULOSKELETAL: bed rest    CODE STATUS: FULL CODE    DISPOSITION: Pt requires continued monitoring in the MICU    Very poor prognosis IMPRESSION:  Severe respiratory fail;ure  severe Covid PNA  Hyponatremia(severe) s/p 3%.  CPA [id number] IMV s/p 7 mins of CPR to  ROSC., 2 codes  3 mins and 1 min  Afib   severe TUYET    metabolic acidosis   multiorgan failure     PLAN:    CNS: assess MS off sedation  avoid temp above 37.7.    HEENT:  Oral care    PULMONARY:  HOB @ 45 degrees, PEEP of 17, 100% FIo2, TV of 450, RR 34      CARDIOVASCULAR: EF <20%, 120 of lasix challenge       GI: GI prophylaxis                                          Feeding NPO for now    RENAL:  F/u  lytes.  Correct as needed. accurate I/O, follow up renal.    INFECTIOUS DISEASE: Conitnue abx  no benefit of pramod at this point.    HEMATOLOGICAL:  DVT prophylaxis. lovenox BID    ENDOCRINE:  Follow up FS.  Insulin protocol if needed.    MUSCULOSKELETAL: bed rest    CODE STATUS: FULL CODE    DISPOSITION: Pt requires continued monitoring in the MICU    Very poor prognosis IMPRESSION:  Severe respiratory fail;ure  severe Covid PNA  Hyponatremia(severe) s/p 3%.  CPA [id number] IMV s/p 7 mins of CPR to  ROSC., 2 codes  3 mins and 1 min  Afib   severe TUYET    metabolic acidosis   multiorgan failure     PLAN:    CNS: assess MS off sedation  avoid temp above 37.7.    HEENT:  Oral care    PULMONARY:  HOB @ 45 degrees, PEEP of 17, 100% FIo2, TV of 450, RR 34      CARDIOVASCULAR: EF <20%, 120 of lasix challenge       GI: GI prophylaxis                                          Feeding NPO for now    RENAL:  F/u  lytes.  Correct as needed. accurate I/O, follow up renal.    INFECTIOUS DISEASE: Conitnue abx  no benefit of pramod at this point.    HEMATOLOGICAL:  DVT prophylaxis. lovenox BID    ENDOCRINE:  Follow up FS.  Insulin protocol if needed.    MUSCULOSKELETAL: bed rest    CODE STATUS: FULL CODE    DISPOSITION: Pt requires continued monitoring in the MICU    Very poor prognosis  case discussed with daughter and wife   full code

## 2022-01-28 NOTE — PROGRESS NOTE ADULT - ASSESSMENT
Patient is a 66y old  Male with PMHx of BPH, and Covid+ 1/19/22 who presents with worsening SOB and confusion.  As per wife, pt more SOB than before, and saturating 85% on home pulse ox.  ON admission, he found to have fever, tachycardia, hypoxia, required BIPAP and positive COVID PCR along with bilateral infiltrate on CXR. He has started on Dexamethasone, Cefepime and IV Vancomycin, and the ID consult requested to assist with further evaluation and antibiotic management.    # Severe sepsis ( Fever + Tachycardia + Tacypnea + Hypoxia)  # COVID pneumonitis - severe , O2 saturation 85 %, less than <94 % in Room air)  # Acute Hypoxic Respiratory failure - on BIPAP  # Suspected  superimposed Bacterial pneumonia  # V Tach/Cardiac arrest with ROSC 1/27   -   Recommendations  This is a preliminary incomplete pended note, all final recommendations to follow after interview and examination of the patient.    Please call or message on [employer] Teams if with any questions.  Spectra [phone]     Patient is a 66y old  Male with PMHx of BPH, and Covid+ 1/19/22 who presents with worsening SOB and confusion.  As per wife, pt more SOB than before, and saturating 85% on home pulse ox.  ON admission, he found to have fever, tachycardia, hypoxia, required BIPAP and positive COVID PCR along with bilateral infiltrate on CXR. He has started on Dexamethasone, Cefepime and IV Vancomycin, and the ID consult requested to assist with further evaluation and antibiotic management.    # Severe sepsis ( Fever + Tachycardia + Tacypnea + Hypoxia)  # COVID pneumonitis - severe , O2 saturation 85 %, less than <94 % in Room air)  - reports vaccination - most recently in October 2021 - no booster  - D-Dimer Assay, Quantitative: 7088: Manufacturers recommended Cut off for VTE is 230 ng/ml D-DU ng/mL DDU (01.27.22 @ 12:15)  -- lower extremity dopplers negative     # Acute Hypoxic Respiratory failure - on BIPAP  # Suspected  superimposed Bacterial pneumonia  # V Tach/Cardiac arrest with ROSC 1/27   -   Recommendations  - discussed with family member regarding hydroxycholoroquine, ivermectin, monoclonal-ab -- reviewed with her ivermectin and hydroxychloroquine are not recommended by multiple medical organizations for COVID pneumonia and he does not meet indication for monoclonal-ab given onset of disease -- if family continues to request, would need ethics consult   - continue cefepime 1g daily for now  - check deep tracheal cultures  - poor prognosis     Please call or message on [employer] Teams if with any questions.  Spectra [phone]

## 2022-01-28 NOTE — PROGRESS NOTE ADULT - SUBJECTIVE AND OBJECTIVE BOX
Over Night Events:  remains critically ill, on fentanyl.  no gag, no pupillary reflexes, not breathing over the vent.  bicarb drip at 150, levo 1.4 and vaso 0.04.     ROS:  See HPI    PHYSICAL EXAM    ICU Vital Signs Last 24 Hrs  T(C): 35.6 (28 Jan 2022 07:00), Max: 36.8 (27 Jan 2022 15:01)  T(F): 96.1 (28 Jan 2022 07:00), Max: 98.2 (27 Jan 2022 15:01)  HR: 90 (28 Jan 2022 08:00) (0 - 160)  BP: 120/72 (28 Jan 2022 07:00) (100/65 - 199/155)  BP(mean): 91 (28 Jan 2022 05:00) (66 - 171)  ABP: 110/69 (28 Jan 2022 08:00) (48/31 - 187/100)  ABP(mean): 84 (28 Jan 2022 08:00) (37 - 122)  RR: 34 (28 Jan 2022 08:00) (0 - 36)  SpO2: 92% (28 Jan 2022 08:00) (63% - 99%)      General: intubated/sedated  HEENT: [place]             Lungs: Bilateral crackles  Cardiovascular: Regular   Abdomen: Soft, Positive BS  Extremities: No clubbing   Skin: Warm  Neurological: unresponsive      01-27-22 @ 07:01  -  01-28-22 @ 07:00  --------------------------------------------------------  IN:    Amiodarone: 233.8 mL    Amiodarone: 349.8 mL    Cisatracurium: 162.2 mL    Dexmedetomidine: 100 mL    FentaNYL: 581 mL    Heparin: 72 mL    IV PiggyBack: 600 mL    Midazolam: 13 mL    Norepinephrine: 2048 mL    Sodium Bicarbonate: 2400 mL    Sodium Chloride 0.9% Bolus: 1000 mL    sodium chloride 3%: 30 mL    Vasopressin: 40.8 mL  Total IN: 7630.6 mL    OUT:    Indwelling Catheter - Urethral (mL): 80 mL    Voided (mL): 260 mL  Total OUT: 340 mL    Total NET: 7290.6 mL      01-28-22 @ 07:01  -  01-28-22 @ 09:52  --------------------------------------------------------  IN:    Amiodarone: 33.4 mL    FentaNYL: 35 mL    Norepinephrine: 280 mL    Sodium Bicarbonate: 300 mL    Vasopressin: 4.8 mL  Total IN: 653.2 mL    OUT:    Indwelling Catheter - Urethral (mL): 0 mL  Total OUT: 0 mL    Total NET: 653.2 mL          LABS:                          12.6   32.81 )-----------( x        ( 28 Jan 2022 06:06 )             39.7                                               01-28    126<L>  |  78<L>  |  26<H>  ----------------------------<  154<H>  5.4<H>   |  9<LL>  |  3.2<H>    Ca    6.9<L>      28 Jan 2022 06:06  Phos  16.7     01-28  Mg     2.7     01-28    TPro  4.0<L>  /  Alb  1.9<L>  /  TBili  1.7<H>  /  DBili  x   /  AST  56717  /  ALT  8852  /  AlkPhos  445<H>  01-28      PT/INR - ( 27 Jan 2022 00:00 )   PT: 15.70 sec;   INR: 1.37 ratio         PTT - ( 28 Jan 2022 06:06 )  PTT:56.7 sec                                       Urinalysis Basic - ( 27 Jan 2022 08:00 )    Color: Yellow / Appearance: Clear / SG: >=1.030 / pH: x  Gluc: x / Ketone: 40  / Bili: Negative / Urobili: 1.0 mg/dL   Blood: x / Protein: 100 mg/dL / Nitrite: Negative   Leuk Esterase: Negative / RBC: 6-10 /HPF / WBC Negative   Sq Epi: x / Non Sq Epi: Few /HPF / Bacteria: Few        CARDIAC MARKERS ( 28 Jan 2022 06:06 )  x     / 1.68 ng/mL / x     / x     / x      CARDIAC MARKERS ( 27 Jan 2022 19:50 )  x     / 1.44 ng/mL / x     / x     / x      CARDIAC MARKERS ( 27 Jan 2022 16:00 )  x     / 0.69 ng/mL / 77837 U/L / x     / 36.4 ng/mL  CARDIAC MARKERS ( 27 Jan 2022 12:15 )  x     / 0.30 ng/mL / x     / x     / x                                                LIVER FUNCTIONS - ( 28 Jan 2022 06:06 )  Alb: 1.9 g/dL / Pro: 4.0 g/dL / ALK PHOS: 445 U/L / ALT: 8852 U/L / AST: 67438 U/L / GGT: x                                                                                               Mode: AC/ CMV (Assist Control/ Continuous Mandatory Ventilation)  RR (machine): 34  TV (machine): 450  FiO2: 100  PEEP: 17  ITime: 1  MAP: 25  PC: 15  PIP: 34                                      ABG - ( 28 Jan 2022 08:07 )  pH, Arterial: 6.87  pH, Blood: x     /  pCO2: 53    /  pO2: 61    / HCO3: 10    / Base Excess: -23.9 /  SaO2: 87.8                MEDICATIONS  (STANDING):  aMIOdarone Infusion 1 mG/Min (33.3 mL/Hr) IV Continuous <Continuous>  aMIOdarone Infusion 0.5 mG/Min (16.7 mL/Hr) IV Continuous <Continuous>  ascorbic acid 500 milliGRAM(s) Oral daily  cefepime   IVPB 1000 milliGRAM(s) IV Intermittent daily  chlorhexidine 0.12% Liquid 15 milliLiter(s) Oral Mucosa every 12 hours  cisatracurium Infusion 3 MICROgram(s)/kG/Min (18.2 mL/Hr) IV Continuous <Continuous>  dexAMETHasone  Injectable 6 milliGRAM(s) IV Push daily  fentaNYL   Infusion. 0.5 MICROgram(s)/kG/Hr (5.05 mL/Hr) IV Continuous <Continuous>  midazolam Infusion 0.02 mG/kG/Hr (2.02 mL/Hr) IV Continuous <Continuous>  norepinephrine Infusion 0.05 MICROgram(s)/kG/Min (4.73 mL/Hr) IV Continuous <Continuous>  sodium bicarbonate  Infusion 0.223 mEq/kG/Hr (150 mL/Hr) IV Continuous <Continuous>  vasopressin Infusion 0.04 Unit(s)/Min (2.4 mL/Hr) IV Continuous <Continuous>    MEDICATIONS  (PRN):  acetaminophen     Tablet .. 650 milliGRAM(s) Oral every 4 hours PRN Temp greater or equal to 38.5C (101.3F)      Xrays:     B/l infiltrates                                                                                ECHO

## 2022-01-28 NOTE — PROGRESS NOTE ADULT - SUBJECTIVE AND OBJECTIVE BOX
ADRI CAMPOS  66y, Male  Allergy: Allergy Status Unknown      LOS  1d    CHIEF COMPLAINT: Confusion / Covid 19 / Hyponatremia (27 Jan 2022 16:17)      INTERVAL EVENTS/HPI  - noted events from yesterday  - on pressors  - T(F): , Max: 98.2 (01-27-22 @ 15:01)  - WBC Count: 40.61 (01-27-22 @ 16:00)  WBC Count: 14.91 (01-27-22 @ 07:30)     - Creatinine, Serum: 2.5 (01-27-22 @ 19:50)  Creatinine, Serum: 1.8 (01-27-22 @ 16:00)       ROS  unable to obtain history secondary to patient's mental status and/or sedation    VITALS:  T(F): 96.1, Max: 98.2 (01-27-22 @ 15:01)  HR: 91  BP: 120/72  RR: 34Vital Signs Last 24 Hrs  T(C): 35.6 (28 Jan 2022 07:00), Max: 36.8 (27 Jan 2022 15:01)  T(F): 96.1 (28 Jan 2022 07:00), Max: 98.2 (27 Jan 2022 15:01)  HR: 91 (28 Jan 2022 07:30) (0 - 177)  BP: 120/72 (28 Jan 2022 07:00) (100/65 - 199/155)  BP(mean): 91 (28 Jan 2022 05:00) (66 - 171)  RR: 34 (28 Jan 2022 07:00) (0 - 39)  SpO2: 92% (28 Jan 2022 07:00) (63% - 99%)    PHYSICAL EXAM:  Gen: intubated  HEENT: Normocephalic, atraumatic  Neck: supple, no lymphadenopathy  CV: Regular rate & regular rhythm  Lungs: decreased BS at bases, no fremitus  Abdomen: Soft, BS present  Ext: Warm, well perfused  Neuro: non focal, sedated  Skin: no rash, no erythema  Lines: no phlebitis    FH: Non-contributory  Social Hx: Non-contributory    TESTS & MEASUREMENTS:                        14.5   40.61 )-----------( 189      ( 27 Jan 2022 16:00 )             44.5     01-27    126<L>  |  79<L>  |  25<H>  ----------------------------<  163<H>  4.3   |  12<L>  |  2.5<H>    Ca    7.8<L>      27 Jan 2022 19:50    TPro  4.8<L>  /  Alb  2.4<L>  /  TBili  1.2  /  DBili  x   /  AST  2401<H>  /  ALT  1358<H>  /  AlkPhos  121<H>  01-27    eGFR if Non African American: 26 mL/min/1.73M2 (01-27-22 @ 19:50)  eGFR if African American: 30 mL/min/1.73M2 (01-27-22 @ 19:50)  eGFR if Non African American: 38 mL/min/1.73M2 (01-27-22 @ 16:00)  eGFR if : 44 mL/min/1.73M2 (01-27-22 @ 16:00)  eGFR if Non African American: 52 mL/min/1.73M2 (01-27-22 @ 12:15)  eGFR if African American: 60 mL/min/1.73M2 (01-27-22 @ 12:15)    LIVER FUNCTIONS - ( 27 Jan 2022 16:00 )  Alb: 2.4 g/dL / Pro: 4.8 g/dL / ALK PHOS: 121 U/L / ALT: 1358 U/L / AST: 2401 U/L / GGT: x           Urinalysis Basic - ( 27 Jan 2022 08:00 )    Color: Yellow / Appearance: Clear / SG: >=1.030 / pH: x  Gluc: x / Ketone: 40  / Bili: Negative / Urobili: 1.0 mg/dL   Blood: x / Protein: 100 mg/dL / Nitrite: Negative   Leuk Esterase: Negative / RBC: 6-10 /HPF / WBC Negative   Sq Epi: x / Non Sq Epi: Few /HPF / Bacteria: Few          Lactate, Blood: 18.2 mmol/L (01-27-22 @ 19:50)  Blood Gas Venous - Lactate: 2.40 mmol/L (01-26-22 @ 23:44)      INFECTIOUS DISEASES TESTING  COVID-19 PCR: Detected (01-27-22 @ 00:00)      INFLAMMATORY MARKERS      RADIOLOGY & ADDITIONAL TESTS:  I have personally reviewed the last available Chest xray  CXR  Xray Chest 1 View- PORTABLE-Urgent:   ACC: [id number] EXAM:  XR CHEST PORTABLE URGENT 1V                          PROCEDURE DATE:  01/27/2022          INTERPRETATION:  Clinical History / Reason for exam: Sepsis    Comparison : Chest radiograph None.    Technique/Positioning: Frontal chest radiograph.    Findings:    Support devices: None.    Cardiac/mediastinum/hilum: Enlarged cardiac silhouette    Lung parenchyma/Pleura: Extensive patchy bilateral pulmonary opacities.   No pneumothorax.    Skeleton/soft tissues: Unremarkable.    Impression:    Extensive patchy bilateral pulmonary opacities. Follow-up recommended.        --- End of Report ---            LINO DUVAL MD; Attending Radiologist  This document has been electronically signed. Jan 27 2022  9:07AM (01-27-22 @ 00:55)      CT      CARDIOLOGY TESTING  12 Lead ECG:   Ventricular Rate 110 BPM    Atrial Rate 326 BPM    QRS Duration 94 ms    Q-T Interval 322 ms    QTC Calculation(Bazett) 435 ms    R Axis 51 degrees    T Axis 58 degrees    Diagnosis Line Atrial fibrillation with rapid ventricular response    Confirmed by SHELLEY LUNA MD ([id number]) on 1/27/2022 11:29:15 AM (01-26-22 @ 23:07)      MEDICATIONS  aMIOdarone Infusion 1 IV Continuous <Continuous>  aMIOdarone Infusion 0.5 IV Continuous <Continuous>  cefepime   IVPB 1000 IV Intermittent daily  chlorhexidine 0.12% Liquid 15 Oral Mucosa every 12 hours  cisatracurium Infusion 3 IV Continuous <Continuous>  dexAMETHasone  Injectable 6 IV Push daily  fentaNYL   Infusion. 0.5 IV Continuous <Continuous>  midazolam Infusion 0.02 IV Continuous <Continuous>  norepinephrine Infusion 0.05 IV Continuous <Continuous>  sodium bicarbonate  Infusion 0.223 IV Continuous <Continuous>  vasopressin Infusion 0.04 IV Continuous <Continuous>      WEIGHT  Weight (kg): 101 (01-27-22 @ 06:29)  Creatinine, Serum: 2.5 mg/dL (01-27-22 @ 19:50)  Creatinine, Serum: 1.8 mg/dL (01-27-22 @ 16:00)  Creatinine, Serum: 1.4 mg/dL (01-27-22 @ 12:15)      ANTIBIOTICS:  cefepime   IVPB 1000 milliGRAM(s) IV Intermittent daily      All available historical records have been reviewed       ADRI CAMPOS  66y, Male  Allergy: Allergy Status Unknown      LOS  1d    CHIEF COMPLAINT: Confusion / Covid 19 / Hyponatremia (27 Jan 2022 16:17)      INTERVAL EVENTS/HPI  - noted events from yesterday  - on pressors - family requesting ivermecting, hydroxycholoquine, monoclonal-ab  - has been sick for a bit more than a week   - T(F): , Max: 98.2 (01-27-22 @ 15:01)  - WBC Count: 40.61 (01-27-22 @ 16:00)  WBC Count: 14.91 (01-27-22 @ 07:30)     - Creatinine, Serum: 2.5 (01-27-22 @ 19:50)  Creatinine, Serum: 1.8 (01-27-22 @ 16:00)       ROS  unable to obtain history secondary to patient's mental status and/or sedation    VITALS:  T(F): 96.1, Max: 98.2 (01-27-22 @ 15:01)  HR: 91  BP: 120/72  RR: 34Vital Signs Last 24 Hrs  T(C): 35.6 (28 Jan 2022 07:00), Max: 36.8 (27 Jan 2022 15:01)  T(F): 96.1 (28 Jan 2022 07:00), Max: 98.2 (27 Jan 2022 15:01)  HR: 91 (28 Jan 2022 07:30) (0 - 177)  BP: 120/72 (28 Jan 2022 07:00) (100/65 - 199/155)  BP(mean): 91 (28 Jan 2022 05:00) (66 - 171)  RR: 34 (28 Jan 2022 07:00) (0 - 39)  SpO2: 92% (28 Jan 2022 07:00) (63% - 99%)    PHYSICAL EXAM:  Gen: intubated  HEENT: Normocephalic, atraumatic  Neck: supple, no lymphadenopathy  CV: Regular rate & regular rhythm  Lungs: decreased BS at bases, no fremitus  Abdomen: Soft, BS present  Ext: Warm, well perfused  Neuro: non focal, sedated  Skin: no rash, no erythema  Lines: no phlebitis    FH: Non-contributory  Social Hx: Non-contributory    TESTS & MEASUREMENTS:                        14.5   40.61 )-----------( 189      ( 27 Jan 2022 16:00 )             44.5     01-27    126<L>  |  79<L>  |  25<H>  ----------------------------<  163<H>  4.3   |  12<L>  |  2.5<H>    Ca    7.8<L>      27 Jan 2022 19:50    TPro  4.8<L>  /  Alb  2.4<L>  /  TBili  1.2  /  DBili  x   /  AST  2401<H>  /  ALT  1358<H>  /  AlkPhos  121<H>  01-27    eGFR if Non African American: 26 mL/min/1.73M2 (01-27-22 @ 19:50)  eGFR if African American: 30 mL/min/1.73M2 (01-27-22 @ 19:50)  eGFR if Non African American: 38 mL/min/1.73M2 (01-27-22 @ 16:00)  eGFR if : 44 mL/min/1.73M2 (01-27-22 @ 16:00)  eGFR if Non African American: 52 mL/min/1.73M2 (01-27-22 @ 12:15)  eGFR if African American: 60 mL/min/1.73M2 (01-27-22 @ 12:15)    LIVER FUNCTIONS - ( 27 Jan 2022 16:00 )  Alb: 2.4 g/dL / Pro: 4.8 g/dL / ALK PHOS: 121 U/L / ALT: 1358 U/L / AST: 2401 U/L / GGT: x           Urinalysis Basic - ( 27 Jan 2022 08:00 )    Color: Yellow / Appearance: Clear / SG: >=1.030 / pH: x  Gluc: x / Ketone: 40  / Bili: Negative / Urobili: 1.0 mg/dL   Blood: x / Protein: 100 mg/dL / Nitrite: Negative   Leuk Esterase: Negative / RBC: 6-10 /HPF / WBC Negative   Sq Epi: x / Non Sq Epi: Few /HPF / Bacteria: Few          Lactate, Blood: 18.2 mmol/L (01-27-22 @ 19:50)  Blood Gas Venous - Lactate: 2.40 mmol/L (01-26-22 @ 23:44)      INFECTIOUS DISEASES TESTING  COVID-19 PCR: Detected (01-27-22 @ 00:00)      INFLAMMATORY MARKERS      RADIOLOGY & ADDITIONAL TESTS:  I have personally reviewed the last available Chest xray  CXR  Xray Chest 1 View- PORTABLE-Urgent:   ACC: [id number] EXAM:  XR CHEST PORTABLE URGENT 1V                          PROCEDURE DATE:  01/27/2022          INTERPRETATION:  Clinical History / Reason for exam: Sepsis    Comparison : Chest radiograph None.    Technique/Positioning: Frontal chest radiograph.    Findings:    Support devices: None.    Cardiac/mediastinum/hilum: Enlarged cardiac silhouette    Lung parenchyma/Pleura: Extensive patchy bilateral pulmonary opacities.   No pneumothorax.    Skeleton/soft tissues: Unremarkable.    Impression:    Extensive patchy bilateral pulmonary opacities. Follow-up recommended.        --- End of Report ---            LINO DUVAL MD; Attending Radiologist  This document has been electronically signed. Jan 27 2022  9:07AM (01-27-22 @ 00:55)      CT      CARDIOLOGY TESTING  12 Lead ECG:   Ventricular Rate 110 BPM    Atrial Rate 326 BPM    QRS Duration 94 ms    Q-T Interval 322 ms    QTC Calculation(Bazett) 435 ms    R Axis 51 degrees    T Axis 58 degrees    Diagnosis Line Atrial fibrillation with rapid ventricular response    Confirmed by SHELLEY LUNA MD ([id number]) on 1/27/2022 11:29:15 AM (01-26-22 @ 23:07)      MEDICATIONS  aMIOdarone Infusion 1 IV Continuous <Continuous>  aMIOdarone Infusion 0.5 IV Continuous <Continuous>  cefepime   IVPB 1000 IV Intermittent daily  chlorhexidine 0.12% Liquid 15 Oral Mucosa every 12 hours  cisatracurium Infusion 3 IV Continuous <Continuous>  dexAMETHasone  Injectable 6 IV Push daily  fentaNYL   Infusion. 0.5 IV Continuous <Continuous>  midazolam Infusion 0.02 IV Continuous <Continuous>  norepinephrine Infusion 0.05 IV Continuous <Continuous>  sodium bicarbonate  Infusion 0.223 IV Continuous <Continuous>  vasopressin Infusion 0.04 IV Continuous <Continuous>      WEIGHT  Weight (kg): 101 (01-27-22 @ 06:29)  Creatinine, Serum: 2.5 mg/dL (01-27-22 @ 19:50)  Creatinine, Serum: 1.8 mg/dL (01-27-22 @ 16:00)  Creatinine, Serum: 1.4 mg/dL (01-27-22 @ 12:15)      ANTIBIOTICS:  cefepime   IVPB 1000 milliGRAM(s) IV Intermittent daily      All available historical records have been reviewed

## 2022-01-28 NOTE — PROGRESS NOTE ADULT - ASSESSMENT
Acute hypoxic respiratory failure - cont vent mngmt per pulm /cc   Severe Covid PNA - on dex , sepsis - iv abxs per ID   d-dimer 7K - dvt ppx   Hyponatremia(severe) s/p 3%.  CPA [id number] IMV s/p 7 mins of CPR to  ROSC., 2 codes  3 mins and 1 min  Afib   severe TUYET    metabolic acidosis   multiorgan failure   grave prognosis   palliative cs Acute hypoxic respiratory failure - cont vent mngmt per pulm /cc   Severe Covid PNA - on dex , sepsis - iv abxs per ID - cefpimine    d-dimer 7K - dvt ppx   Hyponatremia(severe) s/p 3%. - improved   CPA [id number] IMV s/p 7 mins of CPR to  ROSC., 2 codes  3 mins and 1 min  Afib on amio   severe TUYET  - renal eval   metabolic acidosis  - bicarb   multiorgan failure   grave prognosis   palliative cs

## 2022-01-29 LAB
CRP SERPL-MCNC: 84 MG/L — HIGH
FERRITIN SERPL-MCNC: HIGH NG/ML (ref 30–400)

## 2022-02-01 LAB
CULTURE RESULTS: SIGNIFICANT CHANGE UP
CULTURE RESULTS: SIGNIFICANT CHANGE UP
SPECIMEN SOURCE: SIGNIFICANT CHANGE UP
SPECIMEN SOURCE: SIGNIFICANT CHANGE UP

## 2022-02-03 DIAGNOSIS — N17.0 ACUTE KIDNEY FAILURE WITH TUBULAR NECROSIS: ICD-10-CM

## 2022-02-03 DIAGNOSIS — R65.20 SEVERE SEPSIS WITHOUT SEPTIC SHOCK: ICD-10-CM

## 2022-02-03 DIAGNOSIS — J12.82 PNEUMONIA DUE TO CORONAVIRUS DISEASE 2019: ICD-10-CM

## 2022-02-03 DIAGNOSIS — J15.9 UNSPECIFIED BACTERIAL PNEUMONIA: ICD-10-CM

## 2022-02-03 DIAGNOSIS — N40.0 BENIGN PROSTATIC HYPERPLASIA WITHOUT LOWER URINARY TRACT SYMPTOMS: ICD-10-CM

## 2022-02-03 DIAGNOSIS — I46.8 CARDIAC ARREST DUE TO OTHER UNDERLYING CONDITION: ICD-10-CM

## 2022-02-03 DIAGNOSIS — I48.91 UNSPECIFIED ATRIAL FIBRILLATION: ICD-10-CM

## 2022-02-03 DIAGNOSIS — Z87.891 PERSONAL HISTORY OF NICOTINE DEPENDENCE: ICD-10-CM

## 2022-02-03 DIAGNOSIS — U07.1 COVID-19: ICD-10-CM

## 2022-02-03 DIAGNOSIS — R74.01 ELEVATION OF LEVELS OF LIVER TRANSAMINASE LEVELS: ICD-10-CM

## 2022-02-03 DIAGNOSIS — A41.9 SEPSIS, UNSPECIFIED ORGANISM: ICD-10-CM

## 2022-02-03 DIAGNOSIS — J96.01 ACUTE RESPIRATORY FAILURE WITH HYPOXIA: ICD-10-CM

## 2022-02-03 DIAGNOSIS — I47.2 VENTRICULAR TACHYCARDIA: ICD-10-CM

## 2022-02-03 DIAGNOSIS — E83.51 HYPOCALCEMIA: ICD-10-CM

## 2022-02-03 DIAGNOSIS — E87.1 HYPO-OSMOLALITY AND HYPONATREMIA: ICD-10-CM

## 2022-02-03 DIAGNOSIS — E87.2 ACIDOSIS: ICD-10-CM

## 2024-05-03 NOTE — ED ADULT TRIAGE NOTE - WILL THE PATIENT ACCEPT THE PFIZER COVID-19 VACCINE IF ELIGIBLE AND IT IS AVAILABLE?
Problem: Safety  Goal: Patient will be injury free during hospitalization  Outcome: Met  Goal: I will remain free of falls  Outcome: Met  Flowsheets (Taken 5/3/2024 2640)  Resident will remain free of falls:   Apply bed/chair alarms as appropriate   Assist with toileting as orderd   Visual checks per facility policy   Maintain bed at position as ordered (chair height, low bed)   Assess and monitor medications that may increase fall risk     Problem: Fall/Injury  Goal: Not fall by end of shift  Outcome: Met  Goal: Be free from injury by end of the shift  Outcome: Met      Not applicable
